# Patient Record
Sex: FEMALE | Race: WHITE | Employment: OTHER | URBAN - METROPOLITAN AREA
[De-identification: names, ages, dates, MRNs, and addresses within clinical notes are randomized per-mention and may not be internally consistent; named-entity substitution may affect disease eponyms.]

---

## 2017-01-03 ENCOUNTER — OFFICE VISIT (OUTPATIENT)
Dept: INTERNAL MEDICINE CLINIC | Age: 72
End: 2017-01-03

## 2017-01-03 VITALS
WEIGHT: 165 LBS | BODY MASS INDEX: 31.15 KG/M2 | HEART RATE: 60 BPM | SYSTOLIC BLOOD PRESSURE: 143 MMHG | RESPIRATION RATE: 16 BRPM | OXYGEN SATURATION: 98 % | DIASTOLIC BLOOD PRESSURE: 81 MMHG | HEIGHT: 61 IN | TEMPERATURE: 98.1 F

## 2017-01-03 DIAGNOSIS — F41.9 ANXIETY AND DEPRESSION: ICD-10-CM

## 2017-01-03 DIAGNOSIS — E78.00 PURE HYPERCHOLESTEROLEMIA: Chronic | ICD-10-CM

## 2017-01-03 DIAGNOSIS — F32.A ANXIETY AND DEPRESSION: ICD-10-CM

## 2017-01-03 DIAGNOSIS — R73.03 PREDIABETES: ICD-10-CM

## 2017-01-03 DIAGNOSIS — I25.10 ATHEROSCLEROSIS OF NATIVE CORONARY ARTERY OF NATIVE HEART WITHOUT ANGINA PECTORIS: Chronic | ICD-10-CM

## 2017-01-03 DIAGNOSIS — I10 ESSENTIAL HYPERTENSION: Primary | Chronic | ICD-10-CM

## 2017-01-03 DIAGNOSIS — M16.12 PRIMARY OSTEOARTHRITIS OF LEFT HIP: ICD-10-CM

## 2017-01-03 NOTE — PROGRESS NOTES
Reviewed record in preparation for visit and have obtained necessary documentation. Identified pt with two pt identifiers(name and ). Chief Complaint   Patient presents with    Pre-op Exam     left hip on 17       Health Maintenance Due   Topic Date Due    ZOSTER VACCINE AGE 60>  2004    OSTEOPOROSIS SCREENING (DEXA)  2009    GLAUCOMA SCREENING Q2Y  2016    MEDICARE YEARLY EXAM  2016       Ms. Elliott Sicard has a reminder for a \"due or due soon\" health maintenance. I have asked that she discuss health maintenance topic(s) due with Her  primary care provider. Coordination of Care Questionnaire:  :     1) Have you been to an emergency room, urgent care clinic since your last visit? no   Hospitalized since your last visit? no             2) Have you seen or consulted any other health care providers outside of [address] [place] [address] since your last visit? no  (Include any pap smears or colon screenings in this section.)      Patient is accompanied by self I have received verbal consent from Briseida Goldberg to discuss any/all medical information while they are present in the room. \

## 2017-01-03 NOTE — PROGRESS NOTES
Sumi Buck is a 66 y.o. female who presents for evaluation of preop eval for upcoming left hip replacement with dr Lenin murphy. Has already seen dr Aidan Meade and had normal ecg and tte. Would like to get off of lexapro. Was started few years ago after her brother  and while her mom was really sick. ROS:  Constitutional: negative for fevers, chills, anorexia and weight loss  Eyes:   negative for visual disturbance and irritation  ENT:   negative for tinnitus,sore throat,nasal congestion,ear pain,hoarseness  Respiratory:  negative for cough, hemoptysis, dyspnea,wheezing  CV:   negative for chest pain, palpitations, lower extremity edema  GI:   negative for nausea, vomiting, diarrhea, abdominal pain,melena  Genitourinary: negative for frequency, dysuria and hematuria  Musculoskel: negative for myalgias, arthralgias, back pain, muscle weakness. ++left hip joint pain  Neurological:  negative for headaches, dizziness, focal weakness, numbness  Psychiatric:     Negative for depression or anxiety      Past Medical History   Diagnosis Date    Asthma      mild, uses inhaler prn only. No problems x > 1 year    Broken wrist 2013     right wrist    CAD (coronary artery disease) 2361,0136     stents. x2 Currently asymptomatic & exercises 5x/ week    Cancer ([facility] [address]      skin, mole removed    DDD (degenerative disc disease)     DJD (degenerative joint disease)     Environmental allergies     Hypercholesterolemia     Hypertension     Osteopenia     Recurrent UTI        Past Surgical History   Procedure Laterality Date    Endoscopy, colon, diagnostic  7/15//2005     neg; 10 year repeat; Dr. Salome Mueller Hx heart catheterization       1 stent :  R coronary    Hx heart catheterization       LAD stendted 2012    Hx tonsillectomy      Hx tympanostomy  2013     in Right ear    Hx appendectomy      Hx cholecystectomy      Hx jimi and bso      Hx orthopaedic  2011     right RCR  Hx wrist fracture tx  5/2013     ORIF    Hx orthopaedic  1/14/14     L4-5 DECOMPRESSION AND FUSION    Hx orthopaedic       r toe, bone removal    Hx orthopaedic Right 2/2015     THR    Colonoscopy,diagnostic  11/20/2015      tics; no polyps; Dr. Oswaldo Gold; no follow up for screening       Family History   Problem Relation Age of Onset    Elevated Lipids Mother     Hypertension Father     Heart Disease Father     Stroke Father     Other Brother      AAA    Cancer Brother      brain       Social History     Social History    Marital status:      Spouse name: N/A    Number of children: N/A    Years of education: N/A     Occupational History    Not on file. Social History Main Topics    Smoking status: Never Smoker    Smokeless tobacco: Never Used    Alcohol use 0.0 oz/week      Comment: Social      Drug use: No    Sexual activity: Yes     Partners: Male     Birth control/ protection: None     Other Topics Concern    Not on file     Social History Narrative            Visit Vitals    /81 (BP 1 Location: Left arm, BP Patient Position: Sitting)    Pulse 60    Temp 98.1 °F (36.7 °C)    Resp 16    Ht 5' 1\" (1.549 m)    Wt 165 lb (74.8 kg)    LMP  (LMP Unknown)    SpO2 98%    BMI 31.18 kg/m2       Physical Examination:   General - Well appearing female  HEENT - PERRL, TM no erythema/opacification, normal nasal turbinates, no oropharyngeal erythema or exudate, MMM  Neck - supple, no bruits, no thyroidomegaly, no lymphadenopathy  Pulm - clear to auscultation bilaterally  Cardio - RRR, normal S1 S2, no murmur  Abd - soft, nontender, no masses, no HSM  Extrem - no edema, +2 distal pulses  Neuro-  No focal deficits, CN intact     Assessment/Plan:    1. preop eval--no further workup needed, ok to proceed with sx as planned. 2.  htn--continue with cartia and hyzaar  3. Depression--was reactive, would like to stop lexapro. Taper written out for her.   4.  Cad--on asa, follows with dr Inocencio Jung  5. Prediabetes--a1c 6.0.  6. Left hip oa--for hip replacement on jan 17.    rtc 4 months.   Needs dexa bone scan done        Dread Jung III, DO

## 2017-01-03 NOTE — PATIENT INSTRUCTIONS
Hip Arthritis: Care Instructions  Your Care Instructions    Arthritis, also called osteoarthritis, is a breakdown of the tissue (cartilage) that cushions your joints. Many people have some arthritis as they age. When the cartilage in your hip joints wears down, your hip bone rubs against the hip socket. This causes pain and stiffness. Work with your doctor to find the right mix of treatments for your arthritis. There are things you can do at home to protect your hip joints, ease your pain, and help you stay active. But if your arthritis becomes so bad that you cannot walk, you may need surgery to replace the hip joint. Follow-up care is a key part of your treatment and safety. Be sure to make and go to all appointments, and call your doctor if you are having problems. Its also a good idea to know your test results and keep a list of the medicines you take. How can you care for yourself at home? · Stay at a healthy weight. Being overweight puts extra strain on your hip joints. · Talk to your doctor or physical therapist about exercises that will help ease hip pain. These tips may help. ¨ Stretch to help prevent stiffness and to prevent injury before you exercise. You may enjoy gentle forms of yoga to help keep your joints and muscles flexible. ¨ Walk instead of jog. Other types of exercise that are less stressful on the joints include riding a bike, swimming, and doing water exercise. ¨ Lift weights. Strong muscles help reduce stress on your joints. Stronger thigh muscles, for example, take some of the stress off of the knees and hips. Learn the right way to lift weights so you do not make joint pain worse. · Take pain medicines exactly as directed. ¨ If the doctor gave you a prescription medicine for pain, take it as prescribed. ¨ If you are not taking a prescription pain medicine, ask your doctor if you can take an over-the-counter medicine.   · Use a cane, crutch, walker, or another device if you need help to get around. These can help rest your hips. You also can use other things to make life easier, such as a higher toilet seat. · Do not sit in low chairs, which can make it painful to get up. · Put heat or cold on your sore hips as needed. Use whichever helps you most. You also can go back and forth between hot and cold packs. ¨ Apply heat 2 or 3 times a day for 20 to 30 minutesusing a heating pad, hot shower, or hot packto relieve pain and stiffness. ¨ Put ice or a cold pack on your sore hips for 10 to 20 minutes at a time to numb the area. Put a thin cloth between the ice and your skin. · Think about talking to your doctor about using capsaicin, a cream you apply to the skin for pain relief. When should you call for help? Call your doctor now or seek immediate medical care if:  · You have sudden swelling, warmth, or pain in any joint. · You have joint pain and a fever or rash. · You have such bad pain that you cannot use the joint. Watch closely for changes in your health, and be sure to contact your doctor if:  · You have mild joint symptoms that continue even with more than 6 weeks of care at home. · You do not get better as expected. · You have stomach pain or other problems with your medicine. Where can you learn more? Go to http://santo-juani.info/. Enter C666 in the search box to learn more about \"Hip Arthritis: Care Instructions. \"  Current as of: February 24, 2016  Content Version: 11.1  © [id number] [employer]. Care instructions adapted under license by "[employer] (which disclaims liability or warranty for this information). If you have questions about a medical condition or this instruction, always ask your healthcare professional. [place] [address] any warranty or liability for your use of this information. Wean lexapro.   Start 10 mg daily for next 7 days, then take every other day for next week, then every 3rd day for next week, then stop.

## 2017-01-03 NOTE — MR AVS SNAPSHOT
Visit Information Date & Time Provider Department Dept. Phone Encounter #  
 1/3/2017  3:00 PM Lyric Redmond, [address] [id number] Follow-up Instructions Return in about 4 months (around 5/3/2017). Upcoming Health Maintenance Date Due OSTEOPOROSIS SCREENING (DEXA) 3/1/2009 GLAUCOMA SCREENING Q2Y 11/6/2016 MEDICARE YEARLY EXAM 11/9/2016 BREAST CANCER SCRN MAMMOGRAM 11/5/2017 DTaP/Tdap/Td series (2 - Td) 8/4/2026 Allergies as of 1/3/2017  Review Complete On: 1/3/2017 By: Nitza Richard III, DO Severity Noted Reaction Type Reactions Tape [Adhesive] Medium 04/30/2013   Topical Rash Bandaids: redness Ciprofloxacin  11/09/2015   Not Verified Diarrhea And dizziness Pcn [Penicillins]  11/06/2009    Other (comments) \"whelps\" Prednisone  11/21/2012    Other (comments) Terrible downer Current Immunizations  Reviewed on 8/31/2015 Name Date Hepatitis A Vaccine 6/1/2009 Hepatitis B Vaccine 5/29/2009 Influenza Vaccine 10/6/2014, 9/18/2013 Influenza Vaccine (Quad) PF 8/31/2015 Influenza Vaccine Split 10/1/2011 Pneumococcal Vaccine (Unspecified Type) 9/7/2012 TD Vaccine 6/1/2009 Tdap 8/4/2016  5:21 PM  
  
 Not reviewed this visit You Were Diagnosed With   
  
 Codes Comments Essential hypertension    -  Primary ICD-10-CM: I10 
ICD-9-CM: 401.9 Atherosclerosis of native coronary artery of native heart without angina pectoris     ICD-10-CM: I25.10 ICD-9-CM: 414.01 Prediabetes     ICD-10-CM: R73.03 
ICD-9-CM: 790.29 Primary osteoarthritis of left hip     ICD-10-CM: M16.12 
ICD-9-CM: 715.15 Pure hypercholesterolemia     ICD-10-CM: E78.00 ICD-9-CM: 272.0 Anxiety and depression     ICD-10-CM: F41.9, F32.9 ICD-9-CM: 300.00, 311 Vitals BP Pulse Temp Resp Height(growth percentile) Weight(growth percentile) 143/81 (BP 1 Location: Left arm, BP Patient Position: Sitting) 60 98.1 °F (36.7 °C) 16 5' 1\" (1.549 m) 165 lb (74.8 kg) LMP SpO2 BMI OB Status Smoking Status (LMP Unknown) 98% 31.18 kg/m2 Hysterectomy Never Smoker Vitals History BMI and BSA Data Body Mass Index Body Surface Area  
 31.18 kg/m 2 1.79 m 2 Preferred Pharmacy Pharmacy Name Phone [facility]oc[facility] Cort[facility] [address] [place] [address] [address] [phone] Your Updated Medication List  
  
   
This list is accurate as of: 1/3/17  3:49 PM.  Always use your most recent med list.  
  
  
  
  
 acetaminophen 325 mg tablet Commonly known as:  TYLENOL Take 2 Tabs by mouth every six (6) hours. aspirin delayed-release 81 mg tablet Take 81 mg by mouth daily. atorvastatin 80 mg tablet Commonly known as:  LIPITOR  
TAKE 1 TABLET EVERY DAY  
  
 BENEFIBER (GUAR GUM) PO Take  by mouth. CALCIUM 600 + D PO Take 1 Tab by mouth daily. CARTIA  mg ER capsule Generic drug:  dilTIAZem CD  
TAKE 1 CAPSULE EVERY DAY  
  
 escitalopram oxalate 20 mg tablet Commonly known as:  Colan Big TAKE 1 TABLET EVERY DAY  
  
 gabapentin 300 mg capsule Commonly known as:  NEURONTIN Take 300 mg by mouth nightly. Indications: NEUROPATHIC PAIN  
  
 losartan-hydroCHLOROthiazide 100-25 mg per tablet Commonly known as:  HYZAAR Take 1 Tab by mouth daily. meloxicam 15 mg tablet Commonly known as:  MOBIC  
TAKE 1 TABLET EVERY DAY  
  
 MULTIVITAMIN PO Take 1 Tab by mouth daily. NASACORT AQ 55 mcg nasal inhaler Generic drug:  triamcinolone 1 [place] by Both Nostrils route daily. pneumococcal 13 luis armando conj dip 0.5 mL Syrg injection Commonly known as:  PREVNAR 13 (PF)  
0.5 mL by IntraMUSCular route PRIOR TO DISCHARGE for 1 dose. PREMARIN 0.625 mg/gram vaginal cream  
Generic drug:  conjugated estrogens ZyrTEC 10 mg Cap Generic drug:  Cetirizine Take 10 mg by mouth daily. Indications: ALLERGIC RHINITIS Follow-up Instructions Return in about 4 months (around 5/3/2017). To-Do List   
 01/09/2017 9:00 AM  
  Appointment with [facility] PAT ROOM P2 at [address] [place] [address] ([phone]) 01/09/2017 11:15 AM  
  Appointment with [facility] at [facility] PHYSICAL THERAPY ([phone]) Patient Instructions Hip Arthritis: Care Instructions Your Care Instructions Arthritis, also called osteoarthritis, is a breakdown of the tissue (cartilage) that cushions your joints. Many people have some arthritis as they age. When the cartilage in your hip joints wears down, your hip bone rubs against the hip socket. This causes pain and stiffness. Work with your doctor to find the right mix of treatments for your arthritis. There are things you can do at home to protect your hip joints, ease your pain, and help you stay active. But if your arthritis becomes so bad that you cannot walk, you may need surgery to replace the hip joint. Follow-up care is a key part of your treatment and safety. Be sure to make and go to all appointments, and call your doctor if you are having problems. Its also a good idea to know your test results and keep a list of the medicines you take. How can you care for yourself at home? · Stay at a healthy weight. Being overweight puts extra strain on your hip joints. · Talk to your doctor or physical therapist about exercises that will help ease hip pain. These tips may help. ¨ Stretch to help prevent stiffness and to prevent injury before you exercise. You may enjoy gentle forms of yoga to help keep your joints and muscles flexible. ¨ Walk instead of jog. Other types of exercise that are less stressful on the joints include riding a bike, swimming, and doing water exercise. ¨ Lift weights. Strong muscles help reduce stress on your joints.  Stronger thigh muscles, for example, take some of the stress off of the knees and hips. Learn the right way to lift weights so you do not make joint pain worse. · Take pain medicines exactly as directed. ¨ If the doctor gave you a prescription medicine for pain, take it as prescribed. ¨ If you are not taking a prescription pain medicine, ask your doctor if you can take an over-the-counter medicine. · Use a cane, crutch, walker, or another device if you need help to get around. These can help rest your hips. You also can use other things to make life easier, such as a higher toilet seat. · Do not sit in low chairs, which can make it painful to get up. · Put heat or cold on your sore hips as needed. Use whichever helps you most. You also can go back and forth between hot and cold packs. ¨ Apply heat 2 or 3 times a day for 20 to 30 minutesusing a heating pad, hot shower, or hot packto relieve pain and stiffness. ¨ Put ice or a cold pack on your sore hips for 10 to 20 minutes at a time to numb the area. Put a thin cloth between the ice and your skin. · Think about talking to your doctor about using capsaicin, a cream you apply to the skin for pain relief. When should you call for help? Call your doctor now or seek immediate medical care if: 
· You have sudden swelling, warmth, or pain in any joint. · You have joint pain and a fever or rash. · You have such bad pain that you cannot use the joint. Watch closely for changes in your health, and be sure to contact your doctor if: 
· You have mild joint symptoms that continue even with more than 6 weeks of care at home. · You do not get better as expected. · You have stomach pain or other problems with your medicine. Where can you learn more? Go to http://santo-juani.info/. Enter K602 in the search box to learn more about \"Hip Arthritis: Care Instructions. \" Current as of: February 24, 2016 Content Version: 11.1 © 2959-3462 [employer]. Care instructions adapted under license by [employer] (which disclaims liability or warranty for this information). If you have questions about a medical condition or this instruction, always ask your healthcare professional. [address] any warranty or liability for your use of this information. Wean lexapro. Start 10 mg daily for next 7 days, then take every other day for next week, then every 3rd day for next week, then stop. Introducing [facility]! Eloise Howell introduces Amitree patient portal. Now you can access parts of your medical record, email your doctor's office, and request medication refills online. 1. In your internet browser, go to [email or web address] 2. Click on the First Time User? Click Here link in the Sign In box. You will see the New Member Sign Up page. 3. Enter your Amitree Access Code exactly as it appears below. You will not need to use this code after youve completed the sign-up process. If you do not sign up before the expiration date, you must request a new code. · Amitree Access Code: [email or web address] Expires: 4/3/2017 10:15 AM 
 
4. Enter the last four digits of your Social Security Number (xxxx) and Date of Birth (mm/dd/yyyy) as indicated and click Submit. You will be taken to the next sign-up page. 5. Create a Amitree ID. This will be your Amitree login ID and cannot be changed, so think of one that is secure and easy to remember. 6. Create a Amitree password. You can change your password at any time. 7. Enter your Password Reset Question and Answer. This can be used at a later time if you forget your password. 8. Enter your e-mail address. You will receive e-mail notification when new information is available in [address] 9. Click Sign Up. You can now view and download portions of your medical record. 10. Click the Download Summary menu link to download a portable copy of your medical information. If you have questions, please visit the Frequently Asked Questions section of the Xumii website. Remember, Xumii is NOT to be used for urgent needs. For medical emergencies, dial 911. Now available from your iPhone and Android! Please provide this summary of care documentation to your next provider. Your primary care clinician is listed as [facility] If you have any questions after today's visit, please call [phone].

## 2017-01-09 ENCOUNTER — HOSPITAL ENCOUNTER (OUTPATIENT)
Dept: PREADMISSION TESTING | Age: 72
Discharge: HOME | End: 2017-01-09
Payer: MEDICARE

## 2017-01-09 ENCOUNTER — HOSPITAL ENCOUNTER (OUTPATIENT)
Dept: PHYSICAL THERAPY | Age: 72
Discharge: HOME | End: 2017-01-09

## 2017-01-09 VITALS
WEIGHT: 166 LBS | HEIGHT: 61 IN | TEMPERATURE: 97.4 F | SYSTOLIC BLOOD PRESSURE: 137 MMHG | HEART RATE: 67 BPM | DIASTOLIC BLOOD PRESSURE: 68 MMHG | BODY MASS INDEX: 31.34 KG/M2 | OXYGEN SATURATION: 98 % | RESPIRATION RATE: 18 BRPM

## 2017-01-09 LAB
ABO + RH BLD: NORMAL
ALBUMIN SERPL BCP-MCNC: 4.1 G/DL (ref 3.5–5)
ALBUMIN/GLOB SERPL: 1.4 {RATIO} (ref 1.1–2.2)
ALP SERPL-CCNC: 140 U/L (ref 45–117)
ALT SERPL-CCNC: 38 U/L (ref 12–78)
ANION GAP BLD CALC-SCNC: 8 MMOL/L (ref 5–15)
APPEARANCE UR: CLEAR
AST SERPL W P-5'-P-CCNC: 24 U/L (ref 15–37)
BACTERIA URNS QL MICRO: NEGATIVE /HPF
BILIRUB SERPL-MCNC: 0.5 MG/DL (ref 0.2–1)
BILIRUB UR QL: NEGATIVE
BLOOD GROUP ANTIBODIES SERPL: NORMAL
BUN SERPL-MCNC: 19 MG/DL (ref 6–20)
BUN/CREAT SERPL: 20 (ref 12–20)
CALCIUM SERPL-MCNC: 8.8 MG/DL (ref 8.5–10.1)
CHLORIDE SERPL-SCNC: 104 MMOL/L (ref 97–108)
CO2 SERPL-SCNC: 29 MMOL/L (ref 21–32)
COLOR UR: ABNORMAL
CREAT SERPL-MCNC: 0.95 MG/DL (ref 0.55–1.02)
EPITH CASTS URNS QL MICRO: ABNORMAL /LPF
ERYTHROCYTE [DISTWIDTH] IN BLOOD BY AUTOMATED COUNT: 13 % (ref 11.5–14.5)
EST. AVERAGE GLUCOSE BLD GHB EST-MCNC: 117 MG/DL
GLOBULIN SER CALC-MCNC: 3 G/DL (ref 2–4)
GLUCOSE SERPL-MCNC: 82 MG/DL (ref 65–100)
GLUCOSE UR STRIP.AUTO-MCNC: NEGATIVE MG/DL
HBA1C MFR BLD: 5.7 % (ref 4.2–6.3)
HCT VFR BLD AUTO: 42.8 % (ref 35–47)
HGB BLD-MCNC: 14.3 G/DL (ref 11.5–16)
HGB UR QL STRIP: NEGATIVE
INR PPP: 1 (ref 0.9–1.1)
KETONES UR QL STRIP.AUTO: NEGATIVE MG/DL
LEUKOCYTE ESTERASE UR QL STRIP.AUTO: ABNORMAL
MCH RBC QN AUTO: 31.6 PG (ref 26–34)
MCHC RBC AUTO-ENTMCNC: 33.4 G/DL (ref 30–36.5)
MCV RBC AUTO: 94.7 FL (ref 80–99)
NITRITE UR QL STRIP.AUTO: NEGATIVE
PH UR STRIP: 7 [PH] (ref 5–8)
PLATELET # BLD AUTO: 248 K/UL (ref 150–400)
POTASSIUM SERPL-SCNC: 3.5 MMOL/L (ref 3.5–5.1)
PROT SERPL-MCNC: 7.1 G/DL (ref 6.4–8.2)
PROT UR STRIP-MCNC: NEGATIVE MG/DL
PROTHROMBIN TIME: 10.2 SEC (ref 9–11.1)
RBC # BLD AUTO: 4.52 M/UL (ref 3.8–5.2)
RBC #/AREA URNS HPF: ABNORMAL /HPF (ref 0–5)
SODIUM SERPL-SCNC: 141 MMOL/L (ref 136–145)
SP GR UR REFRACTOMETRY: 1.01 (ref 1–1.03)
SPECIMEN EXP DATE BLD: NORMAL
UA: UC IF INDICATED,UAUC: ABNORMAL
UROBILINOGEN UR QL STRIP.AUTO: 0.2 EU/DL (ref 0.2–1)
WBC # BLD AUTO: 5.9 K/UL (ref 3.6–11)
WBC URNS QL MICRO: ABNORMAL /HPF (ref 0–4)

## 2017-01-09 PROCEDURE — 97161 PT EVAL LOW COMPLEX 20 MIN: CPT

## 2017-01-09 PROCEDURE — 36415 COLL VENOUS BLD VENIPUNCTURE: CPT | Performed by: ORTHOPAEDIC SURGERY

## 2017-01-09 PROCEDURE — 86900 BLOOD TYPING SEROLOGIC ABO: CPT | Performed by: ORTHOPAEDIC SURGERY

## 2017-01-09 PROCEDURE — G8979 MOBILITY GOAL STATUS: HCPCS

## 2017-01-09 PROCEDURE — G8980 MOBILITY D/C STATUS: HCPCS

## 2017-01-09 PROCEDURE — 85027 COMPLETE CBC AUTOMATED: CPT | Performed by: ORTHOPAEDIC SURGERY

## 2017-01-09 PROCEDURE — 81001 URINALYSIS AUTO W/SCOPE: CPT | Performed by: ORTHOPAEDIC SURGERY

## 2017-01-09 PROCEDURE — 83036 HEMOGLOBIN GLYCOSYLATED A1C: CPT | Performed by: ORTHOPAEDIC SURGERY

## 2017-01-09 PROCEDURE — G8978 MOBILITY CURRENT STATUS: HCPCS

## 2017-01-09 PROCEDURE — 85610 PROTHROMBIN TIME: CPT | Performed by: ORTHOPAEDIC SURGERY

## 2017-01-09 PROCEDURE — 80053 COMPREHEN METABOLIC PANEL: CPT | Performed by: ORTHOPAEDIC SURGERY

## 2017-01-09 RX ORDER — CELECOXIB 200 MG/1
200 CAPSULE ORAL ONCE
Status: CANCELLED | OUTPATIENT
Start: 2017-01-17 | End: 2017-01-17

## 2017-01-09 RX ORDER — DOXAZOSIN 2 MG/1
2 TABLET ORAL
COMMUNITY

## 2017-01-09 RX ORDER — ACETAMINOPHEN 500 MG
500 TABLET ORAL
COMMUNITY
End: 2017-01-19

## 2017-01-09 RX ORDER — LEVOFLOXACIN 5 MG/ML
500 INJECTION, SOLUTION INTRAVENOUS ONCE
Status: CANCELLED | OUTPATIENT
Start: 2017-01-17 | End: 2017-01-17

## 2017-01-09 RX ORDER — PREGABALIN 75 MG/1
75 CAPSULE ORAL ONCE
Status: CANCELLED | OUTPATIENT
Start: 2017-01-17 | End: 2017-01-17

## 2017-01-09 RX ORDER — SODIUM CHLORIDE, SODIUM LACTATE, POTASSIUM CHLORIDE, CALCIUM CHLORIDE 600; 310; 30; 20 MG/100ML; MG/100ML; MG/100ML; MG/100ML
25 INJECTION, SOLUTION INTRAVENOUS CONTINUOUS
Status: CANCELLED | OUTPATIENT
Start: 2017-01-17

## 2017-01-09 RX ORDER — ACETAMINOPHEN 325 MG/1
650 TABLET ORAL ONCE
Status: CANCELLED | OUTPATIENT
Start: 2017-01-17 | End: 2017-01-17

## 2017-01-09 NOTE — PROGRESS NOTES
[facility]  Physical Therapy Pre-surgery evaluation  [address] [facility], [address] [facility]    physical Therapy pre THR surgery EVALUATION  Patient: Trisha Parekh (40 y.o. female)  Date: 1/9/2017  Primary Diagnosis: left hip DJD            ASSESSMENT :  Based on the objective data described below, the patient presents with with impaired gait, balance, pain, and overall high level functional mobility due to end stage degenerative joint disease in the L hip. She had her R hip replaced 2 years ago and then had back surgery 3 years ago. She has 2 herniated discs currently and has been going to preop PT. Her mother was present during session. Her mother had also had joint replacement. Discussed anticipated disposition to home with possible discharge within a 1 to 2 day time frame post-surgery. Patient and  in agreement. Lives with  and mother. She reports mother will be more of  than . Navid Brown GOALS: (Goals have been discussed and agreed upon with patient.)  DISCHARGE GOALS: Time Frame: 1 DAY  1. Patient will demonstrate increased strength, range of motion, and pain control via a home exercise program in order to minimize functional deficits in preparation for their upcoming surgery. This will be achieved by using education, demonstration and through the use of an informational handout including a home exercise program.  REHABILITATION POTENTIAL FOR STATED GOALS: Good     INTERVENTIONS PLANNED: (Benefits and precautions of physical therapy have been discussed with the patient.)  1. Home Exercise Program  TREATMENT PLAN EFFECTIVE DATES: 1/9/2017 TO 1/9/2017  FREQUENCY/DURATION: Patient to continue to perform home exercise program at least twice daily until her surgery. SUBJECTIVE:   Patient stated I have done it before, same ol same ol haha.     OBJECTIVE DATA SUMMARY:     Past Medical History   Diagnosis Date    Asthma      mild, uses inhaler prn only.  No problems x > 1 year    Broken wrist 11/20/2013     right wrist    CAD (coronary artery disease) 0062,4901     stents. x2 Currently asymptomatic & exercises 5x/ week    Cancer ([facility] [address]      skin, mole removed    DDD (degenerative disc disease)     DJD (degenerative joint disease)     Environmental allergies     Hypercholesterolemia     Hypertension     Osteopenia     Recurrent UTI      Past Surgical History   Procedure Laterality Date    Endoscopy, colon, diagnostic  7/15//2005     neg; 10 year repeat; Dr. Dinah Jiang Hx heart catheterization  2009     1 stent : 2009 R coronary    Hx heart catheterization       LAD stendted 5/2012    Hx tonsillectomy      Hx tympanostomy  02/2013     in Right ear    Hx appendectomy      Hx cholecystectomy      Hx jimi and bso      Hx orthopaedic  2/2011     right RCR     Hx wrist fracture tx  5/2013     ORIF    Hx orthopaedic  1/14/14     L4-5 DECOMPRESSION AND FUSION    Hx orthopaedic       r toe, bone removal    Hx orthopaedic Right 2/2015     THR    Colonoscopy,diagnostic  11/20/2015      tics; no polyps; Dr. Elisha Salgado; no follow up for screening     Prior Level of Function/Home Situation: pt lives with  and mother. She does not use AD but had all of it from previous surgeries. Independent with mobility and ADLs  Home Situation  Home Environment: Private residence  # Steps to Enter: 6  Rails to Enter: Yes  Hand Rails : Right  One/Two Story Residence: One story  Living Alone: Yes  Support Systems: Spouse/Significant Other/Partner, Family member(s)  Patient Expects to be Discharged to[de-identified] Private residence  Current DME Used/Available at Home: Alveta Gallery, rollator, Walker, rolling, Judene Desanctis, straight, Commode, bedside, Shower chair, Grab bars  Tub or Shower Type: Shower    ADLs (Current Functional Status):    Bathing/Showering:   [x] Independent  [] Requires Assistance from Someone  [] Sponge Bath Only   Ambulation:  [x] Independent  [] Walk Indoors Only  [] Walk Outdoors  [] Use Assistive Device  [] Use Wheelchair Only     Dressing:  [x] [address] from Someone for:  [] Sock/Shoes  [] Pants  [] Everything   Household Activities:  [] Routine house and yard work  [x] Light Housework Only  [] None       Critical Behavior:    alert and oriented x 4             Strength:    Strength: Within functional limits                    Tone & Sensation:   Tone: Normal              Sensation: Intact               Range Of Motion:  AROM: Within functional limits           PROM: Within functional limits           Coordination:  Coordination: Within functional limits    Functional Mobility:  Transfers:  Sit to Stand: Independent  Stand to Sit: Independent                       Balance:   Sitting: Intact  Standing: Intact  Ambulation/Gait Training:  Distance (ft): 30 Feet (ft)     Ambulation - Level of Assistance: Independent        Gait Abnormalities: Antalgic, Trunk sway increased             Therapeutic Exercises:   The patient was educated in, has demonstrated, and has received written instructions to complete for their home exercise program per total hip replacement protocol. Functional Measure:  Lower Extremity Functional Scale (LEFS):      Score 30/80     Percentage of impairment CH  0% CI  1-19% CJ  20-39% CK  40-59% CL  60-79% CM  80-99% CN  100%   LEFS score:  0-80 80 64-79 47-63 31-46 16-30 1-15 0     Cutt-offs: None established  TKA and MALIKA:   (Mikado et al, 2000)  MDC = 9 points   MCID = 9 points           G codes: In compliance with CMSs Claims Based Outcome Reporting, the following G-code set was chosen for this patient based on their primary functional limitation being treated: The outcome measure chosen to determine the severity of the functional limitation was the LEFS with a score of 30/80 which was correlated with the impairment scale.     ? Mobility - Walking and Moving Around:     - CURRENT STATUS: CL - 60%-79% impaired, limited or restricted    - GOAL STATUS: CL - 60%-79% impaired, limited or restricted    - D/C STATUS:  CL - 60%-79% impaired, limited or restricted     Pain:                      Activity Tolerance:   WFL      COMMUNICATION/EDUCATION:     The patient was educated on:  [x]         Early post operative mobility is imperative to achieve a patient's desired outcomes and to restore biological function. [x]         Post operative hip precautions may/may not be applicable. These precautions are based on the patient's physician and the procedure(s) performed. [x]         Anterior hip precautions including:  ·  No hip extension  ·       No external rotation  ·       No crossing of the legs/midline    []         Posterior hip precautions including:  ·  No hip flexion >90 degrees  ·       No internal rotation  ·       No crossing of the legs/midline    The patients plan of care was discussed as follows:   [x]         The patient verbalized understanding of her plan in preparation for their upcoming surgery  [x]         The patient's  was present for this session  []        The patient reports that he/she does not have a  identified at this time  [x]         The  verbalized understanding of the education regarding the patient's upcoming surgery  []         Patient/family agree to work toward stated goals and plan of care. []         Patient understands intent and goals of therapy, but is neutral about his/her participation. []         Patient is unable to participate in goal setting and plan of care.       Thank you for this referral.  Felicita Headley, PT, DPT   Time Calculation: 14 mins

## 2017-01-09 NOTE — PERIOP NOTES
[facility]  Preoperative Instructions        Surgery Date 01/17/17          Time of Arrival 0600 am Contact # [phone]    1. On the day of your surgery, please report to the Surgical Services Registration Desk and sign in at your designated time. The Surgery Center is located to the right of the Emergency Room. 2. You must have someone with you to drive you home. You should not drive a car for 24 hours following surgery. Please make arrangements for a friend or family member to stay with you for the first 24 hours after your surgery. 3. Do not have anything to eat or drink (including water, gum, mints, coffee, juice) after midnight 01/16/2017. This may not apply to medications prescribed by your physician. Please note special instructions, if applicable. If you are currently taking Plavix, Coumadin, or other blood-thinning agents, contact your surgeon for instructions. 4. We recommend you do not drink any alcoholic beverages for 24 hours before and after your surgery. 5. Have a list of all current medications, including vitamins, herbal supplements and any other over the counter medications. Stop all Aspirin and non-steroidal anti-inflammatory drugs (I.e. Advil, Aleve), as directed by your surgeon's office. Stop all vitamins and herbal supplements seven days prior to your surgery. 6. Wear comfortable clothes. Wear glasses instead of contacts. Do not bring any money or jewelry. Please bring picture ID, insurance card, and any prearranged co-payment or hospital payment. Do not wear make-up, particularly mascara the morning of your surgery. Do not wear nail polish, particularly if you are having foot /hand surgery. Wear your hair loose or down, no ponytails, buns, natalia pins or clips. All body piercings must be removed.   Please shower with antibacterial soap for three consecutive days before and on the morning of surgery, but do not apply any lotions, powders or deodorants after the shower on the day of surgery. Please use a fresh towels after each shower. Please sleep in clean clothes and change bed linens the night before surgery. Please do not shave for 48 hours prior to surgery. Shaving of the face is acceptable. 7. You should understand that if you do not follow these instructions your surgery may be cancelled. If your physical condition changes (I.e. fever, cold or flu) please contact your surgeon as soon as possible. 8. It is important that you be on time. If a situation occurs where you may be late, please call [phone] (OR Holding Area). 9. If you have any questions and or problems, please call [phone] (Pre-admission Testing). 10. Your surgery time may be subject to change. You will receive a phone call the evening prior if your time changes. 11.  If having outpatient surgery, you must have someone to drive you here, stay with you during the duration of your stay, and to drive you home at time of discharge. Special Instructions:    MEDICATIONS TO TAKE THE MORNING OF SURGERY WITH A SIP OF WATER:tylenol if needed, cardizem, zyrtec, escitalopram, gabapentin, nasocort inhaler      I understand a pre-operative phone call will be made to verify my surgery time. In the event that I am not available, I give permission for a message to be left on my answering service and/or with another person?   yes         ___________________      __________   _________    (Signature of Patient)             (Witness)                (Date and Time)

## 2017-01-09 NOTE — PERIOP NOTES
Preventing Infections Before  and After  Your Surgery  IMPORTANT INSTRUCTIONS    Please read and follow these instructions carefully. Every Night for Three (3) nights before your surgery:  1. Shower with an antibacterial soap, such as Dial, or the soap provided at your preassessment appointment. A shower is better than a bath for cleaning your skin. 2. If needed, ask someone to help you reach all areas of your body. Dont forget to clean your belly button with every shower. The night before your surgery:  1. On the night before your surgery, shower with an antibacterial soap, such as Dial, or the soap provided at your preassessment appointment. 2. With one packet of Hibiclens in hand, turn water off.  3. Apply Hibiclens antiseptic skin cleanser with a clean, freshly washed washcloth. ? Gently apply to your body from chin to toes (except the genital area) and especially the area(s) where your incision(s) will be. ? Leave Hibiclens on your skin for at least 20 seconds. CAUTION: If needed, Hibiclens may be used to clean the folds of skin of the legs (such as in the area of the groin) and on your buttocks and hips. However, do not use Hibiclens above the neck or in the genital area (your bottom) or put inside any area of your body. 4. Turn the water back on and rinse. 5. Dry gently with a clean, freshly washed towel. 6. After your shower, do not use any powder, deodorant, perfumes or lotion. 7. Use clean, freshly washed towels and washcloths every time you shower. 8. Wear clean, freshly washed pajamas to bed the night before surgery. 9. Sleep on clean, freshly washed sheets. 10. Do not allow pets to sleep in your bed with you. The Morning of your surgery:  1. Shower again thoroughly with an antibacterial soap, such as Dial or the soap provided at your preassessment appointment. If needed, ask someone for help to reach all areas of your body. Dont forget to clean your belly button! Rinse.   2. Dry gently with a clean, freshly washed towel. 3. After your shower, do not use any powder, deodorant, perfumes or lotion prior to surgery. 4. Put on clean, freshly washed clothing. Tips to help prevent infections after your surgery:  1. Protect your surgical wound from germs:  ? Hand washing is the most important thing you and your caregivers can do to prevent infections. ? Keep your bandage clean and dry! ? Do not touch your surgical wound. 2. Use clean, freshly washed towels and washcloths every time you shower; do not share bath linens with others. 3. Until your surgical wound is healed, wear clothing and sleep on bed linens each day that are clean and freshly washed. 4. Do not allow pets to sleep in your bed with you or touch your surgical wound. 5. Do not smoke  smoking delays wound healing. This may be a good time to stop smoking. 6. If you have diabetes, it is important for you to manage your blood sugar levels properly before your surgery as well as after your surgery. Poorly managed blood sugar levels slow down wound healing and prevent you from healing completely.

## 2017-01-10 LAB
BACTERIA SPEC CULT: NORMAL
BACTERIA SPEC CULT: NORMAL
SERVICE CMNT-IMP: NORMAL

## 2017-01-11 NOTE — PERIOP NOTES
Called Dr Kristian Corley office and spoke to [facility] to follow up about MRSA culture of Apparent Staph. She asked me to re-fax labs. Done per requests. Fax confirmed.

## 2017-01-13 NOTE — PERIOP NOTES
Spoke to [place] in Dr Vilchis and pt will be treated with bactroban ointment BID for 5 days for Apparent Staph. Script will be called into pt's pharmacy and pt will be notified. Added to prior to admission meds.

## 2017-01-17 ENCOUNTER — APPOINTMENT (OUTPATIENT)
Dept: GENERAL RADIOLOGY | Age: 72
DRG: 470 | End: 2017-01-17
Attending: ORTHOPAEDIC SURGERY
Payer: MEDICARE

## 2017-01-17 ENCOUNTER — ANESTHESIA EVENT (OUTPATIENT)
Dept: SURGERY | Age: 72
DRG: 470 | End: 2017-01-17
Payer: MEDICARE

## 2017-01-17 ENCOUNTER — ANESTHESIA (OUTPATIENT)
Dept: SURGERY | Age: 72
DRG: 470 | End: 2017-01-17
Payer: MEDICARE

## 2017-01-17 PROCEDURE — 73501 X-RAY EXAM HIP UNI 1 VIEW: CPT

## 2017-01-17 PROCEDURE — 77030008684 HC TU ET CUF COVD -B: Performed by: ANESTHESIOLOGY

## 2017-01-17 PROCEDURE — 74011250636 HC RX REV CODE- 250/636: Performed by: ORTHOPAEDIC SURGERY

## 2017-01-17 PROCEDURE — 74011000258 HC RX REV CODE- 258

## 2017-01-17 PROCEDURE — 77030026438 HC STYL ET INTUB CARD -A: Performed by: ANESTHESIOLOGY

## 2017-01-17 PROCEDURE — 74011250636 HC RX REV CODE- 250/636

## 2017-01-17 PROCEDURE — 74011000250 HC RX REV CODE- 250

## 2017-01-17 PROCEDURE — 77030013079 HC BLNKT BAIR HGGR 3M -A: Performed by: ANESTHESIOLOGY

## 2017-01-17 PROCEDURE — 76001 XR FLUOROSCOPY OVER 60 MINUTES: CPT

## 2017-01-17 RX ORDER — PHENYLEPHRINE HCL IN 0.9% NACL 0.4MG/10ML
SYRINGE (ML) INTRAVENOUS AS NEEDED
Status: DISCONTINUED | OUTPATIENT
Start: 2017-01-17 | End: 2017-01-17 | Stop reason: HOSPADM

## 2017-01-17 RX ORDER — FENTANYL CITRATE 50 UG/ML
INJECTION, SOLUTION INTRAMUSCULAR; INTRAVENOUS AS NEEDED
Status: DISCONTINUED | OUTPATIENT
Start: 2017-01-17 | End: 2017-01-17 | Stop reason: HOSPADM

## 2017-01-17 RX ORDER — SUCCINYLCHOLINE CHLORIDE 20 MG/ML
INJECTION INTRAMUSCULAR; INTRAVENOUS AS NEEDED
Status: DISCONTINUED | OUTPATIENT
Start: 2017-01-17 | End: 2017-01-17 | Stop reason: HOSPADM

## 2017-01-17 RX ORDER — ROCURONIUM BROMIDE 10 MG/ML
INJECTION, SOLUTION INTRAVENOUS AS NEEDED
Status: DISCONTINUED | OUTPATIENT
Start: 2017-01-17 | End: 2017-01-17 | Stop reason: HOSPADM

## 2017-01-17 RX ORDER — HYDROMORPHONE HYDROCHLORIDE 2 MG/ML
INJECTION, SOLUTION INTRAMUSCULAR; INTRAVENOUS; SUBCUTANEOUS AS NEEDED
Status: DISCONTINUED | OUTPATIENT
Start: 2017-01-17 | End: 2017-01-17 | Stop reason: HOSPADM

## 2017-01-17 RX ORDER — ONDANSETRON 2 MG/ML
INJECTION INTRAMUSCULAR; INTRAVENOUS AS NEEDED
Status: DISCONTINUED | OUTPATIENT
Start: 2017-01-17 | End: 2017-01-17 | Stop reason: HOSPADM

## 2017-01-17 RX ORDER — LIDOCAINE HYDROCHLORIDE 20 MG/ML
INJECTION, SOLUTION EPIDURAL; INFILTRATION; INTRACAUDAL; PERINEURAL AS NEEDED
Status: DISCONTINUED | OUTPATIENT
Start: 2017-01-17 | End: 2017-01-17 | Stop reason: HOSPADM

## 2017-01-17 RX ORDER — PROPOFOL 10 MG/ML
INJECTION, EMULSION INTRAVENOUS AS NEEDED
Status: DISCONTINUED | OUTPATIENT
Start: 2017-01-17 | End: 2017-01-17 | Stop reason: HOSPADM

## 2017-01-17 RX ORDER — DEXAMETHASONE SODIUM PHOSPHATE 4 MG/ML
INJECTION, SOLUTION INTRA-ARTICULAR; INTRALESIONAL; INTRAMUSCULAR; INTRAVENOUS; SOFT TISSUE AS NEEDED
Status: DISCONTINUED | OUTPATIENT
Start: 2017-01-17 | End: 2017-01-17 | Stop reason: HOSPADM

## 2017-01-17 RX ADMIN — PROPOFOL 20 MG: 10 INJECTION, EMULSION INTRAVENOUS at 10:48

## 2017-01-17 RX ADMIN — LEVOFLOXACIN 500 MG: 5 INJECTION, SOLUTION INTRAVENOUS at 08:29

## 2017-01-17 RX ADMIN — ONDANSETRON 4 MG: 2 INJECTION INTRAMUSCULAR; INTRAVENOUS at 10:09

## 2017-01-17 RX ADMIN — FENTANYL CITRATE 100 MCG: 50 INJECTION, SOLUTION INTRAMUSCULAR; INTRAVENOUS at 08:27

## 2017-01-17 RX ADMIN — Medication 120 MCG: at 10:07

## 2017-01-17 RX ADMIN — Medication 80 MCG: at 08:47

## 2017-01-17 RX ADMIN — Medication 80 MCG: at 09:51

## 2017-01-17 RX ADMIN — Medication 80 MCG: at 08:56

## 2017-01-17 RX ADMIN — DEXAMETHASONE SODIUM PHOSPHATE 4 MG: 4 INJECTION, SOLUTION INTRA-ARTICULAR; INTRALESIONAL; INTRAMUSCULAR; INTRAVENOUS; SOFT TISSUE at 08:40

## 2017-01-17 RX ADMIN — SUCCINYLCHOLINE CHLORIDE 140 MG: 20 INJECTION INTRAMUSCULAR; INTRAVENOUS at 08:27

## 2017-01-17 RX ADMIN — Medication 80 MCG: at 09:31

## 2017-01-17 RX ADMIN — LIDOCAINE HYDROCHLORIDE 80 MG: 20 INJECTION, SOLUTION EPIDURAL; INFILTRATION; INTRACAUDAL; PERINEURAL at 08:27

## 2017-01-17 RX ADMIN — PROPOFOL 20 MG: 10 INJECTION, EMULSION INTRAVENOUS at 10:52

## 2017-01-17 RX ADMIN — ROCURONIUM BROMIDE 5 MG: 10 INJECTION, SOLUTION INTRAVENOUS at 08:27

## 2017-01-17 RX ADMIN — Medication 80 MCG: at 08:52

## 2017-01-17 RX ADMIN — PROPOFOL 10 MG: 10 INJECTION, EMULSION INTRAVENOUS at 10:56

## 2017-01-17 RX ADMIN — Medication 120 MCG: at 10:11

## 2017-01-17 RX ADMIN — PROPOFOL 150 MG: 10 INJECTION, EMULSION INTRAVENOUS at 08:27

## 2017-01-17 RX ADMIN — Medication 80 MCG: at 09:36

## 2017-01-17 RX ADMIN — Medication 120 MCG: at 10:09

## 2017-01-17 RX ADMIN — Medication 80 MCG: at 09:41

## 2017-01-17 RX ADMIN — HYDROMORPHONE HYDROCHLORIDE 0.5 MG: 2 INJECTION, SOLUTION INTRAMUSCULAR; INTRAVENOUS; SUBCUTANEOUS at 08:56

## 2017-01-17 RX ADMIN — Medication 160 MCG: at 10:15

## 2017-01-17 RX ADMIN — Medication 120 MCG: at 09:46

## 2017-01-17 RX ADMIN — Medication 80 MCG: at 09:57

## 2017-01-17 NOTE — ANESTHESIA POSTPROCEDURE EVALUATION
Post-Anesthesia Evaluation and Assessment    Patient: Terry Lyon MRN: [id number]  SSN: [id number]    YOB: 1945  Age: 66 y.o. Sex: female       Cardiovascular Function/Vital Signs  Visit Vitals    /50    Pulse 60    Temp 36.4 °C (97.6 °F)    Resp 11    Ht 5' 1\" (1.549 m)    Wt 74.7 kg (164 lb 10.9 oz)    SpO2 97%    BMI 31.12 kg/m2       Patient is status post general anesthesia for Procedure(s):  LEFT TOTAL HIP ARTHROPLASTY ANTERIOR APPROACH WITH NAVIGATION. Nausea/Vomiting: None    Postoperative hydration reviewed and adequate. Pain:  Pain Scale 1: Numeric (0 - 10) (01/17/17 1200)  Pain Intensity 1: 1 (01/17/17 1200)   Managed    Neurological Status:   Neuro (WDL): Within Defined Limits (01/17/17 1200)  Neuro  Neurologic State: Drowsy; Eyes open to stimulus (01/17/17 1200)  Orientation Level: Oriented to person (01/17/17 1200)  Cognition: Appropriate for age attention/concentration; Follows commands (01/17/17 1200)  Speech: Clear (01/17/17 1200)  LUE Motor Response: Purposeful;Spontaneous  (01/17/17 1200)  LLE Motor Response: Purposeful;Spontaneous  (01/17/17 1200)  RUE Motor Response: Purposeful;Spontaneous  (01/17/17 1200)  RLE Motor Response: Purposeful;Spontaneous  (01/17/17 1200)   At baseline    Mental Status and Level of Consciousness: Arousable    Pulmonary Status:   O2 Device: Nasal cannula (01/17/17 1200)   Adequate oxygenation and airway patent    Complications related to anesthesia: None    Post-anesthesia assessment completed.  No concerns    Signed By: Evelyn Angel MD     January 17, 2017

## 2017-01-17 NOTE — ANESTHESIA PREPROCEDURE EVALUATION
Anesthetic History   No history of anesthetic complications            Review of Systems / Medical History  Patient summary reviewed, nursing notes reviewed and pertinent labs reviewed    Pulmonary            Asthma        Neuro/Psych             Comments: Tinnitus of right ear       Left lumbar radiculitis      Spinal stenosis      Hx of decompressive lumbar laminectomy      Hip osteoarthritis    Cardiovascular    Hypertension: well controlled          CAD and cardiac stents    Exercise tolerance: <4 METS     GI/Hepatic/Renal  Within defined limits              Endo/Other        Arthritis and cancer     Other Findings              Physical Exam    Airway  Mallampati: II  TM Distance: 4 - 6 cm  Neck ROM: normal range of motion   Mouth opening: Normal     Cardiovascular  Regular rate and rhythm,  S1 and S2 normal,  no murmur, click, rub, or gallop  Rhythm: regular  Rate: normal         Dental  No notable dental hx       Pulmonary  Breath sounds clear to auscultation               Abdominal  GI exam deferred       Other Findings            Anesthetic Plan    ASA: 3  Anesthesia type: general          Induction: Intravenous  Anesthetic plan and risks discussed with: Patient

## 2017-01-20 ENCOUNTER — PATIENT OUTREACH (OUTPATIENT)
Dept: INTERNAL MEDICINE CLINIC | Age: 72
End: 2017-01-20

## 2017-01-20 NOTE — PROGRESS NOTES
NNTOCIP Post Hospitalization       This note will not be viewable in [employer]. [facility]. [facility] to [facility] on 1/17/17 and discharged on 1/19/17 s/p Left Total Hip Arthroplasty (Dr. Vilchis). Significant Lab/Diagnostic Findings:  Lab Results   Component Value Date/Time    Sodium 142 01/18/2017 06:05 AM    Potassium 3.6 01/18/2017 06:05 AM    Chloride 107 01/18/2017 06:05 AM    CO2 23 01/18/2017 06:05 AM    Anion gap 12 01/18/2017 06:05 AM    Glucose 110 01/18/2017 06:05 AM    BUN 16 01/18/2017 06:05 AM    Creatinine 0.81 01/18/2017 06:05 AM    BUN/Creatinine ratio 20 01/18/2017 06:05 AM    GFR est AA >60 01/18/2017 06:05 AM    GFR est non-AA >60 01/18/2017 06:05 AM    Calcium 8.1 01/18/2017 06:05 AM      Component      Latest Ref Rng & Units 1/19/2017 1/18/2017           4:55 AM  6:05 AM   HGB      11.5 - 16.0 g/dL 10.7 (L) 11.4 (L)                   RRAT score:   Low Risk            11       Total Score        3 Relationship with PCP    4 More than 1 Admission in calendar year    4 Charlson Comorbidity Score        Criteria that do not apply:    Patient Living Status    Patient Length of Stay > 5    Patient Insurance is Medicare, Medicaid or Self Pay                  Advance Medical Directive on file in EMR? Yes; has an advanced directive - a copy has been provided. Patient was evaluated by care management during hospitalization. Per notes, Discharge Disposition from ED [facility]: Home with [address] through [facility]. Recommended Post-Hospital Discharge follow-up (see below as listed on Hospital Discharge AVS/Instructions). Follow-up Information     Follow up With Details Comments Contact Info      [address]   This is your provider for your home health. If you do not hear from them within 24-48 hours, please contact them. [address] [facility]  [phone]     [facility]   This your provider for DME (rolling walker).   [address] [place] [address] [place] III, DO     [facility]  [address] [facility]  [address] [address]  [phone]     Duyen Pisano MD In 2 weeks   [facility]  [address] [facility] [address]  [id number] [phone]           Discharge instructions:  - Anticoagulate with ASA  - Take pain medications as prescribed  - Resume pre hospital diet   - Discharge activity: activity as tolerated  - Ambulate with Walker;   - Weight bearing status WBAT  - Wound Care Keep wound clean and dry. See discharge instruction sheet.  - Staples to be removed 10 days after surgery         Most recent HIPAA form in the patient's chart (dated 2/18/15) was reviewed; authorized individual(s) listed on HIPAA form include Rose Walters. NN follow-up phone call  NN contacted the patient today to complete NN post-hospital discharge assessment. Two patient identifiers verified. NN introduced self to the patient, including NN role and purpose of NN follow-up phone call; patient verbalizes understanding. NN inquired about the patient's current condition and how the patient is feeling; patient states, \"pretty decent. \"  Patient states, \"I've had a little nausea. \" Patient denies vomiting. Encouraged patient to take Oxycodone with food; patient verbalizes understanding. Patient denies fever, chills, dizziness, chest pain. Patient denies increased/unrelieved pain since hospital discharge. See details of Functional Assessment below as reported by the Patient. Living Situation/Support System: Lives with her  and her Mother; reports a good support system. ADLs: Independent with ADLs. Mobility: Georgiana Cui; reports delivered to residence today by [employer]. Transportation: Patient's Mother will be providing her with transportation until she receives MD clearance to resume driving; reports able to drive prior to surgery.  Patient denies any concerns regarding transportation. Medication Management: Independently manages her medications. Financial Status: Denies any financial concerns regarding her mediations. Barriers to care? no       Allergies Reviewed with patient and Medication Reconciliation completed and updated. Patient verbalizes understanding of self management of medications and reports compliance with prescribed medication regimen. Med Rec during this call  Current Outpatient Prescriptions   Medication Sig    acetaminophen (TYLENOL) 325 mg tablet Take 2 Tabs by mouth every six (6) hours for 14 days.  famotidine (PEPCID) 20 mg tablet Take 1 Tab by mouth two (2) times a day for 30 days.  oxyCODONE IR (ROXICODONE) 5 mg immediate release tablet Take 1-2 Tabs by mouth every three (3) hours as needed. Max Daily Amount: 80 mg.  polyethylene glycol (MIRALAX) 17 gram packet Take 1 Packet by mouth daily as needed (constipation) for up to 15 days.  senna-docusate (PERICOLACE) 8.6-50 mg per tablet Take 1 Tab by mouth daily.  [START ON 2/2/2017] aspirin (ASPIRIN) 325 mg tablet Take 1 Tab by mouth two (2) times a day for 30 days. To begin after you have completing 2 weeks of Coumadin    doxazosin (CARDURA) 2 mg tablet Take 2 mg by mouth nightly.  CARTIA  mg ER capsule TAKE 1 CAPSULE EVERY DAY    escitalopram oxalate (LEXAPRO) 20 mg tablet TAKE 1 TABLET EVERY DAY    PREMARIN 0.625 mg/gram vaginal cream Insert  into vagina every Monday, Wednesday, Friday.  atorvastatin (LIPITOR) 80 mg tablet TAKE 1 TABLET EVERY DAY    losartan-hydrochlorothiazide (HYZAAR) 100-25 mg per tablet Take 1 Tab by mouth daily.  BENEFIBER, GUAR GUM, PO Take 2 Tabs by mouth daily.  gabapentin (NEURONTIN) 300 mg capsule Take 300 mg by mouth two (2) times a day. Indications: NEUROPATHIC PAIN    triamcinolone (NASACORT AQ) 55 mcg nasal inhaler 1 [place] by Both Nostrils route daily.     MULTIVITAMIN PO Take 1 Tab by mouth daily.    Cetirizine (ZYRTEC) 10 mg Cap Take 10 mg by mouth daily. Indications: ALLERGIC RHINITIS    pneumococcal 13 luis armando conj dip (PREVNAR 13, PF,) 0.5 mL syrg injection 0.5 mL by IntraMUSCular route PRIOR TO DISCHARGE for 1 dose. No current facility-administered medications for this visit. Medications Discontinued During This Encounter   Medication Reason    mupirocin calcium (BACTROBAN NASAL) 2 % nasal ointment Therapy Completed                 Bactroban Nasal Ointment discontinued because patient reports that she is not currently taking; reports therapy completed. Not A Current Medication; Therapy Completed. Med Rec updated. New medications at discharge include: Aspirin 325 mg (Start Taking on 2/2/17), Pepcid, Oxycodone, Miralax, Pericolace  Medication(s) changed at hospital discharge include: Tylenol  Medication(s) discontinued at hospital discharge include: Aspirin 81 mg tablet, Meloxicam, Menthol Topical Gel  Patient able to fill/pickup new medications without difficulty: Yes. Any Questions/Concerns regarding new Medication(s) and/or Medication Change(s): Denies any questions/concerns. - Home Health. Per patient, home health has not contacted her since hospital discharge. Advised patient to contact home health agency this afternoon; patient verbalizes understanding and NN provided patient with contact information for home health agency. Patient verbalizes understanding of discharge instructions that were provided to her upon discharge from [facility], including activity restrictions. Red Flags reviewed with patient and patient understands when to contact Dr. Chava Velazco' office versus use of EMS. Patient has been scheduled for a MOI appointment with Dr. Zulema Mendoza; patient verbalizes understanding of appointment information. Opportunity for patient to ask NN questions was provided. NN contact information provided to patient and patient advised to contact NN as needed.   Red Flags:  Warning signs: Please call your physician immediately at [phone] if you have  · Bleeding from incision that is constant. · Change in mental status (unusual behavior or confusion)  · If your incision develops redness or swelling  · Change in wound drainage (increase in amount, color, or foul odor)  · Temperature over 101.5 degrees Fahrenheit   · Pain in the calf of your leg  · Tenderness or redness in the calf of your leg  · Increased swelling of the thigh, ankle, calf, or foot.     Emergency: CALL 911 if you have  · Shortness of breath  · Chest pain  · Localized chest pain when coughing or taking a deep breath      PLAN    -Patient to attend Transitions Of Care Appointment with Dr. Braden Delacruz on 1/27/17.  -Patient to attend follow-up appointment(s) with Surgeon(if applicable) and/or Speciality Provider(s): Dr. Vilchis - to be scheduled by the patient.  -Patient to contact this NN and/or PCP office with any questions/concerns.  -Notified of availability of PCP on-call physician after-hours and on the weekends for non-emergent/non-life threatening medical questions/concerns; patient verbalizes understanding. Patient's currently scheduled future appointments are listed below. Future Appointments      Provider Martín Murray   1/27/2017 10:30 AM [facility]   4/4/2017 10:15 AM [facility]                 Patient expressed no questions, concerns or needs for this NN at this time. Patient verbalized understanding of all information discussed. NN contact information provided and patient advised to contact NN as needed. NN will route this encounter to Dr. Braden Delacruz for notification/review.

## 2017-01-27 ENCOUNTER — OFFICE VISIT (OUTPATIENT)
Dept: INTERNAL MEDICINE CLINIC | Age: 72
End: 2017-01-27

## 2017-01-27 VITALS
BODY MASS INDEX: 32.66 KG/M2 | TEMPERATURE: 97.7 F | HEIGHT: 61 IN | SYSTOLIC BLOOD PRESSURE: 129 MMHG | RESPIRATION RATE: 18 BRPM | DIASTOLIC BLOOD PRESSURE: 71 MMHG | HEART RATE: 66 BPM | WEIGHT: 173 LBS | OXYGEN SATURATION: 97 %

## 2017-01-27 DIAGNOSIS — I10 ESSENTIAL HYPERTENSION: Primary | Chronic | ICD-10-CM

## 2017-01-27 DIAGNOSIS — K59.00 CONSTIPATION, UNSPECIFIED CONSTIPATION TYPE: ICD-10-CM

## 2017-01-27 RX ORDER — ADHESIVE BANDAGE
30 BANDAGE TOPICAL DAILY
Qty: 180 ML | Refills: 1 | Status: SHIPPED | OUTPATIENT
Start: 2017-01-27 | End: 2017-08-08 | Stop reason: ALTCHOICE

## 2017-01-27 NOTE — PATIENT INSTRUCTIONS
Constipation: Care Instructions  Your Care Instructions  Constipation means that you have a hard time passing stools (bowel movements). People pass stools from 3 times a day to once every 3 days. What is normal for you may be different. Constipation may occur with pain in the rectum and cramping. The pain may get worse when you try to pass stools. Sometimes there are small amounts of bright red blood on toilet paper or the surface of stools. This is because of enlarged veins near the rectum (hemorrhoids). A few changes in your diet and lifestyle may help you avoid ongoing constipation. Your doctor may also prescribe medicine to help loosen your stool. Some medicines can cause constipation. These include pain medicines and antidepressants. Tell your doctor about all the medicines you take. Your doctor may want to make a medicine change to ease your symptoms. Follow-up care is a key part of your treatment and safety. Be sure to make and go to all appointments, and call your doctor if you are having problems. It's also a good idea to know your test results and keep a list of the medicines you take. How can you care for yourself at home? · Drink plenty of fluids, enough so that your urine is light yellow or clear like water. If you have kidney, heart, or liver disease and have to limit fluids, talk with your doctor before you increase the amount of fluids you drink. · Include high-fiber foods in your diet each day. These include fruits, vegetables, beans, and whole grains. · Get at least 30 minutes of exercise on most days of the week. Walking is a good choice. You also may want to do other activities, such as running, swimming, cycling, or playing tennis or team sports. · Take a fiber supplement, such as Citrucel or Metamucil, every day. Read and follow all instructions on the label. · Schedule time each day for a bowel movement. A daily routine may help.  Take your time having your bowel movement. · Support your feet with a small step stool when you sit on the toilet. This helps flex your hips and places your pelvis in a squatting position. · Your doctor may recommend an over-the-counter laxative to relieve your constipation. Examples are Milk of Magnesia and MiraLax. Read and follow all instructions on the label. Do not use laxatives on a long-term basis. When should you call for help? Call your doctor now or seek immediate medical care if:  · You have new or worse belly pain. · You have new or worse nausea or vomiting. · You have blood in your stools. Watch closely for changes in your health, and be sure to contact your doctor if:  · Your constipation is getting worse. · You do not get better as expected. Where can you learn more? Go to http://santo-juani.info/. Enter [phone] in the search box to learn more about \"Constipation: Care Instructions. \"  Current as of: May 27, 2016  Content Version: 11.1  © 2585-5063 [employer]. Care instructions adapted under license by [employer] (which disclaims liability or warranty for this information). If you have questions about a medical condition or this instruction, always ask your healthcare professional. [place] [address] any warranty or liability for your use of this information. Continue with colace and miralax. Add milk of mag.

## 2017-01-27 NOTE — PROGRESS NOTES
Reviewed record in preparation for visit and have obtained necessary documentation. Identified pt with two pt identifiers(name and ). Chief Complaint   Patient presents with    Other     MOI; left hip replacement on 17       Health Maintenance Due   Topic Date Due    OSTEOPOROSIS SCREENING (DEXA)  2009    GLAUCOMA SCREENING Q2Y  2016    MEDICARE YEARLY EXAM  2016       Ms. Isidro Olivarez has a reminder for a \"due or due soon\" health maintenance. I have asked that she discuss health maintenance topic(s) due with Her  primary care provider. Coordination of Care Questionnaire:  :     1) Have you been to an emergency room, urgent care clinic since your last visit? no   Hospitalized since your last visit? yes             2) Have you seen or consulted any other health care providers outside of [address] [place] [address] since your last visit? no  (Include any pap smears or colon screenings in this section.)      Patient is accompanied by self I have received verbal consent from Ibrahima Petersen to discuss any/all medical information while they are present in the room.

## 2017-01-27 NOTE — MR AVS SNAPSHOT
Visit Information Date & Time Provider Department Dept. Phone Encounter #  
 1/27/2017 10:30 AM Burt Wray, [address] [phone] [id number] Follow-up Instructions Return in about 3 months (around 4/27/2017). Your Appointments 4/4/2017 10:15 AM  
ROUTINE CARE with DO MANAV Powers III [facility] [place] [address]) Appt Note: 3 month follow up  
 [facility] [address] [id number]  
[address] [facility] [address] [place] [address] Upcoming Health Maintenance Date Due OSTEOPOROSIS SCREENING (DEXA) 3/1/2009 GLAUCOMA SCREENING Q2Y 11/6/2016 MEDICARE YEARLY EXAM 11/9/2016 BREAST CANCER SCRN MAMMOGRAM 11/5/2017 DTaP/Tdap/Td series (2 - Td) 8/4/2026 Allergies as of 1/27/2017  Review Complete On: 1/27/2017 By: Shivam Serrano III, DO Severity Noted Reaction Type Reactions Tape [Adhesive] Medium 04/30/2013   Topical Rash Bandaids: redness Ciprofloxacin  11/09/2015   Not Verified Diarrhea And dizziness Pcn [Penicillins]  11/06/2009    Other (comments) \"whelps\" Prednisone  11/21/2012    Other (comments) Terrible downer Current Immunizations  Reviewed on 8/31/2015 Name Date Hepatitis A Vaccine 6/1/2009 Hepatitis B Vaccine 5/29/2009 Influenza Vaccine 10/1/2016, 10/6/2014, 9/18/2013 Influenza Vaccine (Quad) PF 8/31/2015 Influenza Vaccine Split 10/1/2011 Pneumococcal Vaccine (Unspecified Type) 9/7/2012 TD Vaccine 6/1/2009 Tdap 8/4/2016  5:21 PM  
  
 Not reviewed this visit You Were Diagnosed With   
  
 Codes Comments Essential hypertension    -  Primary ICD-10-CM: I10 
ICD-9-CM: 401.9 Constipation, unspecified constipation type     ICD-10-CM: K59.00 ICD-9-CM: 564.00 Vitals BP Pulse Temp Resp Height(growth percentile) Weight(growth percentile) 129/71 (BP 1 Location: Left arm, BP Patient Position: Sitting) 66 97.7 °F (36.5 °C) (Oral) 18 5' 1\" (1.549 m) 173 lb (78.5 kg) LMP SpO2 BMI OB Status Smoking Status (LMP Unknown) 97% 32.69 kg/m2 Hysterectomy Never Smoker Vitals History BMI and BSA Data Body Mass Index Body Surface Area  
 32.69 kg/m 2 1.84 m 2 Preferred Pharmacy Pharmacy Name Phone [facility] [phone], [address] [place] [address] [phone] Your Updated Medication List  
  
   
This list is accurate as of: 1/27/17 11:19 AM.  Always use your most recent med list.  
  
  
  
  
 acetaminophen 325 mg tablet Commonly known as:  TYLENOL Take 2 Tabs by mouth every six (6) hours for 14 days. aspirin 325 mg tablet Commonly known as:  ASPIRIN Take 1 Tab by mouth two (2) times a day for 30 days. To begin after you have completing 2 weeks of Coumadin Start taking on:  2/2/2017  
  
 atorvastatin 80 mg tablet Commonly known as:  LIPITOR  
TAKE 1 TABLET EVERY DAY  
  
 BENEFIBER (GUAR GUM) PO Take 2 Tabs by mouth daily. CARTIA  mg ER capsule Generic drug:  dilTIAZem CD  
TAKE 1 CAPSULE EVERY DAY  
  
 doxazosin 2 mg tablet Commonly known as:  CARDURA Take 2 mg by mouth nightly. escitalopram oxalate 20 mg tablet Commonly known as:  Braulio Barban TAKE 1 TABLET EVERY DAY  
  
 famotidine 20 mg tablet Commonly known as:  PEPCID Take 1 Tab by mouth two (2) times a day for 30 days. gabapentin 300 mg capsule Commonly known as:  NEURONTIN Take 300 mg by mouth two (2) times a day. Indications: NEUROPATHIC PAIN  
  
 losartan-hydroCHLOROthiazide 100-25 mg per tablet Commonly known as:  HYZAAR Take 1 Tab by mouth daily. magnesium hydroxide 400 mg/5 mL suspension Commonly known as:  MILK OF MAGNESIA Take 30 mL by mouth daily. Indications: Constipation  MULTIVITAMIN PO  
 Take 1 Tab by mouth daily. NASACORT AQ 55 mcg nasal inhaler Generic drug:  triamcinolone 1 Crawford by Both Nostrils route daily. oxyCODONE IR 5 mg immediate release tablet Commonly known as:  Madalyn Ramal Take 1-2 Tabs by mouth every three (3) hours as needed. Max Daily Amount: 80 mg.  
  
 pneumococcal 13 luis armando conj dip 0.5 mL Syrg injection Commonly known as:  PREVNAR 13 (PF)  
0.5 mL by IntraMUSCular route PRIOR TO DISCHARGE for 1 dose. polyethylene glycol 17 gram packet Commonly known as:  Angela Jose Guadalupe Take 1 Packet by mouth daily as needed (constipation) for up to 15 days. PREMARIN 0.625 mg/gram vaginal cream  
Generic drug:  conjugated estrogens Insert  into vagina every Monday, Wednesday, Friday. senna-docusate 8.6-50 mg per tablet Commonly known as:  Tanvir Edward Take 1 Tab by mouth daily. ZyrTEC 10 mg Cap Generic drug:  Cetirizine Take 10 mg by mouth daily. Indications: ALLERGIC RHINITIS Prescriptions Sent to Pharmacy Refills  
 magnesium hydroxide (MILK OF MAGNESIA) 400 mg/5 mL suspension 1 Sig: Take 30 mL by mouth daily. Indications: Constipation Class: Normal  
 [facility] [address] [place] [address] [place] [address] Ph #: [phone] Route: Oral  
  
Follow-up Instructions Return in about 3 months (around 4/27/2017). Patient Instructions Constipation: Care Instructions Your Care Instructions Constipation means that you have a hard time passing stools (bowel movements). People pass stools from 3 times a day to once every 3 days. What is normal for you may be different. Constipation may occur with pain in the rectum and cramping. The pain may get worse when you try to pass stools. Sometimes there are small amounts of bright red blood on toilet paper or the surface of stools. This is because of enlarged veins near the rectum (hemorrhoids). A few changes in your diet and lifestyle may help you avoid ongoing constipation. Your doctor may also prescribe medicine to help loosen your stool. Some medicines can cause constipation. These include pain medicines and antidepressants. Tell your doctor about all the medicines you take. Your doctor may want to make a medicine change to ease your symptoms. Follow-up care is a key part of your treatment and safety. Be sure to make and go to all appointments, and call your doctor if you are having problems. It's also a good idea to know your test results and keep a list of the medicines you take. How can you care for yourself at home? · Drink plenty of fluids, enough so that your urine is light yellow or clear like water. If you have kidney, heart, or liver disease and have to limit fluids, talk with your doctor before you increase the amount of fluids you drink. · Include high-fiber foods in your diet each day. These include fruits, vegetables, beans, and whole grains. · Get at least 30 minutes of exercise on most days of the week. Walking is a good choice. You also may want to do other activities, such as running, swimming, cycling, or playing tennis or team sports. · Take a fiber supplement, such as Citrucel or Metamucil, every day. Read and follow all instructions on the label. · Schedule time each day for a bowel movement. A daily routine may help. Take your time having your bowel movement. · Support your feet with a small step stool when you sit on the toilet. This helps flex your hips and places your pelvis in a squatting position. · Your doctor may recommend an over-the-counter laxative to relieve your constipation. Examples are Milk of Magnesia and MiraLax. Read and follow all instructions on the label. Do not use laxatives on a long-term basis. When should you call for help? Call your doctor now or seek immediate medical care if: 
· You have new or worse belly pain. · You have new or worse nausea or vomiting. · You have blood in your stools. Watch closely for changes in your health, and be sure to contact your doctor if: 
· Your constipation is getting worse. · You do not get better as expected. Where can you learn more? Go to http://santo-juani.info/. Enter [phone] in the search box to learn more about \"Constipation: Care Instructions. \" Current as of: May 27, 2016 Content Version: 11.1 © 3539-1413 [employer]. Care instructions adapted under license by [employer] (which disclaims liability or warranty for this information). If you have questions about a medical condition or this instruction, always ask your healthcare professional. [address] any warranty or liability for your use of this information. Continue with colace and miralax. Add milk of mag. Introducing [facility]! Noman Castrejon introduces [employer] patient portal. Now you can access parts of your medical record, email your doctor's office, and request medication refills online. 1. In your internet browser, go to [email or web address] 2. Click on the First Time User? Click Here link in the Sign In box. You will see the New Member Sign Up page. 3. Enter your [employer] Access Code exactly as it appears below. You will not need to use this code after youve completed the sign-up process. If you do not sign up before the expiration date, you must request a new code. · [employer] Access Code: [email or web address] Expires: 4/3/2017 10:15 AM 
 
4. Enter the last four digits of your Social Security Number (xxxx) and Date of Birth (mm/dd/yyyy) as indicated and click Submit. You will be taken to the next sign-up page. 5. Create a GOOMt ID. This will be your [employer] login ID and cannot be changed, so think of one that is secure and easy to remember. 6. Create a [employer] password. You can change your password at any time. 7. Enter your Password Reset Question and Answer. This can be used at a later time if you forget your password. 8. Enter your e-mail address. You will receive e-mail notification when new information is available in [address] 9. Click Sign Up. You can now view and download portions of your medical record. 10. Click the Download Summary menu link to download a portable copy of your medical information. If you have questions, please visit the Frequently Asked Questions section of the [employer] website. Remember, [employer] is NOT to be used for urgent needs. For medical emergencies, dial 911. Now available from your iPhone and Android! Please provide this summary of care documentation to your next provider. Your primary care clinician is listed as Antonio Bustamante If you have any questions after today's visit, please call [phone].

## 2017-01-27 NOTE — PROGRESS NOTES
Rosalia Harmon is a 66 y.o. female who presents for evaluation of transition [facility]. [facility]. Doing ok, though is constipated. Already using colace and miralax. ROS:  Constitutional: negative for fevers, chills, anorexia and weight loss  Eyes:   negative for visual disturbance and irritation  ENT:   negative for tinnitus,sore throat,nasal congestion,ear pain,hoarseness  Respiratory:  negative for cough, hemoptysis, dyspnea,wheezing  CV:   negative for chest pain, palpitations, lower extremity edema  GI:   negative for nausea, vomiting, diarrhea, abdominal pain,melena  Genitourinary: negative for frequency, dysuria and hematuria  Musculoskel: negative for myalgias, arthralgias, back pain, muscle weakness, joint pain  Neurological:  negative for headaches, dizziness, focal weakness, numbness  Psychiatric:     Negative for depression or anxiety      Past Medical History   Diagnosis Date    Asthma      mild, uses inhaler prn only. No problems x > 1 year    Broken wrist 11/20/2013     right wrist    CAD (coronary artery disease) 5753,8105     stents. x2 Currently asymptomatic & exercises 5x/ week    Cancer ([facility] [address]      skin, mole removed    DDD (degenerative disc disease)     DJD (degenerative joint disease)     Environmental allergies     Hypercholesterolemia     Hypertension     Osteopenia     Recurrent UTI        Past Surgical History   Procedure Laterality Date    Endoscopy, colon, diagnostic  7/15//2005     neg; 10 year repeat; Dr. Alexander Copeland Hx appendectomy      Hx cholecystectomy      Hx jimi and bso      Colonoscopy,diagnostic  11/20/2015      tics; no polyps; Dr. Jenni Spurling; no follow up for screening    Hx heart catheterization  2009 1 stent : 2009 R coronary    Hx heart catheterization       LAD stendted 5/2012    Hx orthopaedic  2/2011     right RCR     Hx wrist fracture tx Right 5/2013     ORIF    Hx orthopaedic  1/14/14     L4-5 DECOMPRESSION AND FUSION    Hx orthopaedic       r toe, bone removal    Hx orthopaedic Right 2/2015     THR    Hx tonsillectomy      Hx tympanostomy  02/2013     in Right ear    Hx hip replacement Left 01/17/2017       Family History   Problem Relation Age of Onset    Elevated Lipids Mother     Hypertension Father     Heart Disease Father     Stroke Father     Other Brother      AAA    Cancer Brother      brain       Social History     Social History    Marital status:      Spouse name: N/A    Number of children: N/A    Years of education: N/A     Occupational History    Not on file. Social History Main Topics    Smoking status: Never Smoker    Smokeless tobacco: Never Used    Alcohol use 0.0 oz/week      Comment: Social      Drug use: No    Sexual activity: Yes     Partners: Male     Birth control/ protection: None     Other Topics Concern    Not on file     Social History Narrative            Visit Vitals    /71 (BP 1 Location: Left arm, BP Patient Position: Sitting)    Pulse 66    Temp 97.7 °F (36.5 °C) (Oral)    Resp 18    Ht 5' 1\" (1.549 m)    Wt 173 lb (78.5 kg)    LMP  (LMP Unknown)    SpO2 97%    BMI 32.69 kg/m2       Physical Examination:   General - Well appearing female  HEENT - PERRL, TM no erythema/opacification, normal nasal turbinates, no oropharyngeal erythema or exudate, MMM  Neck - supple, no bruits, no thyroidomegaly, no lymphadenopathy  Pulm - clear to auscultation bilaterally  Cardio - RRR, normal S1 S2, no murmur  Abd - soft, nontender, no masses, no HSM  Extrem - no edema, +2 distal pulses  Neuro-  No focal deficits, CN intact     Assessment/Plan:    1. Constipation--add milk of mag. Continue with colace and miralax  2.  htn--good control with cartia and hyzaar  3. Left hip oa--sp MALIKA. Has follow up with dr murphy later today. On asa 325 bid now.     rtc 3 months        Aung Rod III, DO

## 2017-02-10 RX ORDER — LOSARTAN POTASSIUM AND HYDROCHLOROTHIAZIDE 25; 100 MG/1; MG/1
TABLET ORAL
Qty: 90 TAB | Refills: 3 | Status: SHIPPED | OUTPATIENT
Start: 2017-02-10 | End: 2018-02-17 | Stop reason: SDUPTHER

## 2017-03-08 ENCOUNTER — TELEPHONE (OUTPATIENT)
Dept: INTERNAL MEDICINE CLINIC | Age: 72
End: 2017-03-08

## 2017-03-08 NOTE — TELEPHONE ENCOUNTER
Pt states she has swelling & discomfort in arms, calves and legs. She has discomfort in chest and that is bothering her. Pt would like to be seen tomorrow.

## 2017-03-10 ENCOUNTER — OFFICE VISIT (OUTPATIENT)
Dept: INTERNAL MEDICINE CLINIC | Age: 72
End: 2017-03-10

## 2017-03-10 VITALS
WEIGHT: 164 LBS | SYSTOLIC BLOOD PRESSURE: 144 MMHG | BODY MASS INDEX: 30.96 KG/M2 | HEIGHT: 61 IN | HEART RATE: 71 BPM | TEMPERATURE: 98 F | OXYGEN SATURATION: 98 % | DIASTOLIC BLOOD PRESSURE: 80 MMHG | RESPIRATION RATE: 16 BRPM

## 2017-03-10 DIAGNOSIS — E78.00 PURE HYPERCHOLESTEROLEMIA: Chronic | ICD-10-CM

## 2017-03-10 DIAGNOSIS — F32.A ANXIETY AND DEPRESSION: ICD-10-CM

## 2017-03-10 DIAGNOSIS — M81.0 POST-MENOPAUSAL OSTEOPOROSIS: ICD-10-CM

## 2017-03-10 DIAGNOSIS — R07.9 CHEST PAIN, UNSPECIFIED TYPE: Primary | ICD-10-CM

## 2017-03-10 DIAGNOSIS — I10 ESSENTIAL HYPERTENSION: Chronic | ICD-10-CM

## 2017-03-10 DIAGNOSIS — F41.9 ANXIETY AND DEPRESSION: ICD-10-CM

## 2017-03-10 DIAGNOSIS — I25.10 ATHEROSCLEROSIS OF NATIVE CORONARY ARTERY OF NATIVE HEART WITHOUT ANGINA PECTORIS: Chronic | ICD-10-CM

## 2017-03-10 DIAGNOSIS — M16.12 PRIMARY OSTEOARTHRITIS OF LEFT HIP: ICD-10-CM

## 2017-03-10 RX ORDER — TRAMADOL HYDROCHLORIDE 50 MG/1
TABLET ORAL
Refills: 0 | COMMUNITY
Start: 2017-02-27 | End: 2017-07-19

## 2017-03-10 RX ORDER — MELOXICAM 15 MG/1
TABLET ORAL
COMMUNITY
Start: 2017-02-24 | End: 2017-08-23 | Stop reason: SDUPTHER

## 2017-03-10 RX ORDER — FUROSEMIDE 40 MG/1
40 TABLET ORAL
Qty: 30 TAB | Refills: 1 | Status: SHIPPED | OUTPATIENT
Start: 2017-03-10 | End: 2017-03-10 | Stop reason: SDUPTHER

## 2017-03-10 RX ORDER — ESCITALOPRAM OXALATE 10 MG/1
10 TABLET ORAL DAILY
Qty: 90 TAB | Refills: 3 | Status: SHIPPED | OUTPATIENT
Start: 2017-03-10

## 2017-03-10 RX ORDER — FUROSEMIDE 40 MG/1
TABLET ORAL
Qty: 90 TAB | Refills: 1 | Status: SHIPPED | OUTPATIENT
Start: 2017-03-10 | End: 2019-10-14

## 2017-03-10 NOTE — PROGRESS NOTES
Reviewed record in preparation for visit and have obtained necessary documentation. Identified pt with two pt identifiers(name and ). Chief Complaint   Patient presents with    Leg Swelling     bilateral    Hand Swelling     bilateral    Foot Swelling     bilateral    Chest Pain     last week    Flank Pain     left       Health Maintenance Due   Topic Date Due    OSTEOPOROSIS SCREENING (DEXA)  2009    GLAUCOMA SCREENING Q2Y  2016    MEDICARE YEARLY EXAM  2016       Ms. Robbin Rao has a reminder for a \"due or due soon\" health maintenance. I have asked that she discuss health maintenance topic(s) due with Her  primary care provider. Coordination of Care Questionnaire:  :     1) Have you been to an emergency room, urgent care clinic since your last visit? no   Hospitalized since your last visit? no             2) Have you seen or consulted any other health care providers outside of [address] [place] [address] since your last visit? no  (Include any pap smears or colon screenings in this section.)      Patient is accompanied by self I have received verbal consent from Himanshu Aburto to discuss any/all medical information while they are present in the room.

## 2017-03-10 NOTE — PROGRESS NOTES
Maciej Olmstead is a 67 y.o. female who presents for evaluation of bilateral leg edema. Onset last week, comes and goes. Does not have any today. Also having increased anxiety, as we stopped her lexapro at last visit, on jan 27. She is also currently getting ready to file for divorce. Had complete cardiac workup done with dr Robert Dangelo in January that was normal.      ROS:  Constitutional: negative for fevers, chills, anorexia and weight loss  Eyes:   negative for visual disturbance and irritation  ENT:   negative for tinnitus,sore throat,nasal congestion,ear pain,hoarseness  Respiratory:  negative for cough, hemoptysis, dyspnea,wheezing  CV:   negative for chest pain, palpitations. ++ lower extremity edema  GI:   negative for nausea, vomiting, diarrhea, abdominal pain,melena  Genitourinary: negative for frequency, dysuria and hematuria  Musculoskel: negative for myalgias, arthralgias, back pain, muscle weakness, joint pain  Neurological:  negative for headaches, dizziness, focal weakness, numbness  Psychiatric:     ++ for depression or anxiety      Past Medical History:   Diagnosis Date    Asthma     mild, uses inhaler prn only. No problems x > 1 year    Broken wrist 11/20/2013    right wrist    CAD (coronary artery disease) 7891,4809    stents. x2 Currently asymptomatic & exercises 5x/ week    Cancer ([facility] [address]     skin, mole removed    DDD (degenerative disc disease)     DJD (degenerative joint disease)     Environmental allergies     Hypercholesterolemia     Hypertension     Osteopenia     Recurrent UTI        Past Surgical History:   Procedure Laterality Date    COLONOSCOPY,DIAGNOSTIC  11/20/2015     tics; no polyps; Dr. Tello More; no follow up for screening    ENDOSCOPY, COLON, DIAGNOSTIC  7/15//2005    neg; 10 year repeat; Dr. Olya Mishra  2009    1 stent : 2009 R coronary    HX HEART CATHETERIZATION      LAD stendted 5/2012    HX HIP REPLACEMENT Left 01/17/2017    HX ORTHOPAEDIC  2/2011    right RCR     HX ORTHOPAEDIC  1/14/14    L4-5 DECOMPRESSION AND FUSION    HX ORTHOPAEDIC      r toe, bone removal    HX ORTHOPAEDIC Right 2/2015    THR    HX SHITAL AND BSO      HX TONSILLECTOMY      HX TYMPANOSTOMY  02/2013    in Right ear    HX WRIST FRACTURE TX Right 5/2013    ORIF       Family History   Problem Relation Age of Onset    Elevated Lipids Mother     Hypertension Father     Heart Disease Father     Stroke Father     Other Brother      AAA    Cancer Brother      brain       Social History     Social History    Marital status:      Spouse name: N/A    Number of children: N/A    Years of education: N/A     Occupational History    Not on file. Social History Main Topics    Smoking status: Never Smoker    Smokeless tobacco: Never Used    Alcohol use 0.0 oz/week      Comment: Social      Drug use: No    Sexual activity: Yes     Partners: Male     Birth control/ protection: None     Other Topics Concern    Not on file     Social History Narrative            Visit Vitals    /80 (BP 1 Location: Left arm, BP Patient Position: Sitting)    Pulse 71    Temp 98 °F (36.7 °C) (Oral)    Resp 16    Ht 5' 1\" (1.549 m)    Wt 164 lb (74.4 kg)    LMP  (LMP Unknown)    SpO2 98%    BMI 30.99 kg/m2       Physical Examination:   General - Well appearing female  HEENT - PERRL, TM no erythema/opacification, normal nasal turbinates, no oropharyngeal erythema or exudate, MMM  Neck - supple, no bruits, no thyroidomegaly, no lymphadenopathy  Pulm - clear to auscultation bilaterally  Cardio - RRR, normal S1 S2, no murmur  Abd - soft, nontender, no masses, no HSM  Extrem - no edema, +2 distal pulses--no edema today at all  Neuro-  No focal deficits, CN intact     Assessment/Plan:    1. Transient edema--rx given for prn lasix. Had negative cardiac workup few months ago. No need to repeat now.   Has seen dr Bethany Muñiz in past if needed. 2.  Anxiety and depression--resume lexapro  3.  htn--continue hyzaar and cartia  4. Cad--on asa  5. Prediabetes  6. Routine adult health maintenance--dexa scan ordered    rtc for regular visit.         Aung Dinero III, DO

## 2017-03-10 NOTE — MR AVS SNAPSHOT
Visit Information Date & Time Provider Department Dept. Phone Encounter #  
 3/10/2017 12:00 PM Zuleika Rader, [address] [phone] Follow-up Instructions Return if symptoms worsen or fail to improve. Your Appointments 4/4/2017 10:15 AM  
ROUTINE CARE with DO MANAV Walton III [facility]) Appt Note: 3 month follow up  
 [facility] [address] [id number]  
[address] [facility] [address] [place] [address] Upcoming Health Maintenance Date Due OSTEOPOROSIS SCREENING (DEXA) 3/1/2009 GLAUCOMA SCREENING Q2Y 11/6/2016 MEDICARE YEARLY EXAM 11/9/2016 BREAST CANCER SCRN MAMMOGRAM 11/5/2017 DTaP/Tdap/Td series (2 - Td) 8/4/2026 Allergies as of 3/10/2017  Review Complete On: 3/10/2017 By: Celso Damian III, DO Severity Noted Reaction Type Reactions Tape [Adhesive] Medium 04/30/2013   Topical Rash Bandaids: redness Ciprofloxacin  11/09/2015   Not Verified Diarrhea And dizziness Pcn [Penicillins]  11/06/2009    Other (comments) \"whelps\" Prednisone  11/21/2012    Other (comments) Terrible downer Current Immunizations  Reviewed on 8/31/2015 Name Date Hepatitis A Vaccine 6/1/2009 Hepatitis B Vaccine 5/29/2009 Influenza Vaccine 10/1/2016, 10/6/2014, 9/18/2013 Influenza Vaccine (Quad) PF 8/31/2015 Influenza Vaccine Split 10/1/2011 Pneumococcal Vaccine (Unspecified Type) 9/7/2012 TD Vaccine 6/1/2009 Tdap 8/4/2016  5:21 PM  
  
 Not reviewed this visit You Were Diagnosed With   
  
 Codes Comments Chest pain, unspecified type    -  Primary ICD-10-CM: R07.9 ICD-9-CM: 786.50 Essential hypertension     ICD-10-CM: I10 
ICD-9-CM: 401.9 Atherosclerosis of native coronary artery of native heart without angina pectoris     ICD-10-CM: I25.10 ICD-9-CM: 414.01   
 Pure hypercholesterolemia     ICD-10-CM: E78.00 ICD-9-CM: 272.0 Anxiety and depression     ICD-10-CM: F41.9, F32.9 ICD-9-CM: 300.00, 311 Primary osteoarthritis of left hip     ICD-10-CM: M16.12 
ICD-9-CM: 715.15 Post-menopausal osteoporosis     ICD-10-CM: M81.0 ICD-9-CM: 733.01 Vitals BP Pulse Temp Resp Height(growth percentile) Weight(growth percentile) 144/80 (BP 1 Location: Left arm, BP Patient Position: Sitting) 71 98 °F (36.7 °C) (Oral) 16 5' 1\" (1.549 m) 164 lb (74.4 kg) LMP SpO2 BMI OB Status Smoking Status (LMP Unknown) 98% 30.99 kg/m2 Hysterectomy Never Smoker Vitals History BMI and BSA Data Body Mass Index Body Surface Area 30.99 kg/m 2 1.79 m 2 Preferred Pharmacy Pharmacy Name Phone [place] [phone] - [address] , [address] AT [place] [address] [phone] Your Updated Medication List  
  
   
This list is accurate as of: 3/10/17  1:03 PM.  Always use your most recent med list.  
  
  
  
  
 atorvastatin 80 mg tablet Commonly known as:  LIPITOR  
TAKE 1 TABLET EVERY DAY  
  
 BENEFIBER (GUAR GUM) PO Take 2 Tabs by mouth daily. CARTIA  mg ER capsule Generic drug:  dilTIAZem CD  
TAKE 1 CAPSULE EVERY DAY  
  
 doxazosin 2 mg tablet Commonly known as:  CARDURA Take 2 mg by mouth nightly. escitalopram oxalate 10 mg tablet Commonly known as:  Mary Levo Take 1 Tab by mouth daily. furosemide 40 mg tablet Commonly known as:  LASIX Take 1 Tab by mouth daily as needed. Indications: Edema  
  
 gabapentin 300 mg capsule Commonly known as:  NEURONTIN Take 300 mg by mouth two (2) times a day. Indications: NEUROPATHIC PAIN  
  
 losartan-hydroCHLOROthiazide 100-25 mg per tablet Commonly known as:  HYZAAR  
TAKE 1 TABLET EVERY DAY  
  
 magnesium hydroxide 400 mg/5 mL suspension Commonly known as:  MILK OF MAGNESIA Take 30 mL by mouth daily. Indications: Constipation  
  
 meloxicam 15 mg tablet Commonly known as:  MOBIC  
  
 MULTIVITAMIN PO Take 1 Tab by mouth daily. NASACORT AQ 55 mcg nasal inhaler Generic drug:  triamcinolone 1 [place] by Both Nostrils route daily. PREMARIN 0.625 mg/gram vaginal cream  
Generic drug:  conjugated estrogens Insert  into vagina every Monday, Wednesday, Friday. senna-docusate 8.6-50 mg per tablet Commonly known as:  Wagner Gwendolyn Take 1 Tab by mouth daily. traMADol 50 mg tablet Commonly known as:  ULTRAM  
TK 1 TO 2 TS PO Q 4 TO 6 H PRN P  
  
 ZyrTEC 10 mg Cap Generic drug:  Cetirizine Take 10 mg by mouth daily. Indications: ALLERGIC RHINITIS Prescriptions Sent to Pharmacy Refills  
 escitalopram oxalate (LEXAPRO) 10 mg tablet 3 Sig: Take 1 Tab by mouth daily. Class: Normal  
 Pharmacy: [address] [place] [address], [address] [place] [address] Ph #: [phone] Route: Oral  
 furosemide (LASIX) 40 mg tablet 1 Sig: Take 1 Tab by mouth daily as needed. Indications: Edema Class: Normal  
 Pharmacy: [facility] [address] [place] [address] Ph #: [phone] Route: Oral  
  
We Performed the Following AMB POC EKG ROUTINE W/ 12 LEADS, INTER & REP [78946 CPT(R)] Follow-up Instructions Return if symptoms worsen or fail to improve. To-Do List   
 03/10/2017 Imaging:  DEXA BONE DENSITY STUDY AXIAL Patient Instructions DASH Diet: Care Instructions Your Care Instructions The DASH diet is an eating plan that can help lower your blood pressure. DASH stands for Dietary Approaches to Stop Hypertension. Hypertension is high blood pressure. The DASH diet focuses on eating foods that are high in calcium, potassium, and magnesium. These nutrients can lower blood pressure.  The foods that are highest in these nutrients are fruits, vegetables, low-fat dairy products, nuts, seeds, and legumes. But taking calcium, potassium, and magnesium supplements instead of eating foods that are high in those nutrients does not have the same effect. The DASH diet also includes whole grains, fish, and poultry. The DASH diet is one of several lifestyle changes your doctor may recommend to lower your high blood pressure. Your doctor may also want you to decrease the amount of sodium in your diet. Lowering sodium while following the DASH diet can lower blood pressure even further than just the DASH diet alone. Follow-up care is a key part of your treatment and safety. Be sure to make and go to all appointments, and call your doctor if you are having problems. It's also a good idea to know your test results and keep a list of the medicines you take. How can you care for yourself at home? Following the DASH diet · Eat 4 to 5 servings of fruit each day. A serving is 1 medium-sized piece of fruit, ½ cup chopped or canned fruit, 1/4 cup dried fruit, or 4 ounces (½ cup) of fruit juice. Choose fruit more often than fruit juice. · Eat 4 to 5 servings of vegetables each day. A serving is 1 cup of lettuce or raw leafy vegetables, ½ cup of chopped or cooked vegetables, or 4 ounces (½ cup) of vegetable juice. Choose vegetables more often than vegetable juice. · Get 2 to 3 servings of low-fat and fat-free dairy each day. A serving is 8 ounces of milk, 1 cup of yogurt, or 1 ½ ounces of cheese. · Eat 6 to 8 servings of grains each day. A serving is 1 slice of bread, 1 ounce of dry cereal, or ½ cup of cooked rice, pasta, or cooked cereal. Try to choose whole-grain products as much as possible. · Limit lean meat, poultry, and fish to 2 servings each day. A serving is 3 ounces, about the size of a deck of cards.  
· Eat 4 to 5 servings of nuts, seeds, and legumes (cooked dried beans, lentils, and split peas) each week. A serving is 1/3 cup of nuts, 2 tablespoons of seeds, or ½ cup of cooked beans or peas. · Limit fats and oils to 2 to 3 servings each day. A serving is 1 teaspoon of vegetable oil or 2 tablespoons of salad dressing. · Limit sweets and added sugars to 5 servings or less a week. A serving is 1 tablespoon jelly or jam, ½ cup sorbet, or 1 cup of lemonade. · Eat less than 2,300 milligrams (mg) of sodium a day. If you limit your sodium to 1,500 mg a day, you can lower your blood pressure even more. Tips for success · Start small. Do not try to make dramatic changes to your diet all at once. You might feel that you are missing out on your favorite foods and then be more likely to not follow the plan. Make small changes, and stick with them. Once those changes become habit, add a few more changes. · Try some of the following: ¨ Make it a goal to eat a fruit or vegetable at every meal and at snacks. This will make it easy to get the recommended amount of fruits and vegetables each day. ¨ Try yogurt topped with fruit and nuts for a snack or healthy dessert. ¨ Add lettuce, tomato, cucumber, and onion to sandwiches. ¨ Combine a ready-made pizza crust with low-fat mozzarella cheese and lots of vegetable toppings. Try using tomatoes, squash, spinach, broccoli, carrots, cauliflower, and onions. ¨ Have a variety of cut-up vegetables with a low-fat dip as an appetizer instead of chips and dip. ¨ Sprinkle sunflower seeds or chopped almonds over salads. Or try adding chopped walnuts or almonds to cooked vegetables. ¨ Try some vegetarian meals using beans and peas. Add garbanzo or kidney beans to salads. Make burritos and tacos with mashed mcnally beans or black beans. Where can you learn more? Go to http://santo-juani.info/. Enter L636 in the search box to learn more about \"DASH Diet: Care Instructions. \" Current as of: March 23, 2016 Content Version: 11.1 © 1360-7537 Song[employer]. Care instructions adapted under license by [employer] (which disclaims liability or warranty for this information). If you have questions about a medical condition or this instruction, always ask your healthcare professional. [address] any warranty or liability for your use of this information. Leg and Ankle Edema: Care Instructions Your Care Instructions Swelling in the legs, ankles, and feet is called edema. It is common after you sit or stand for a while. Long plane flights or car rides often cause swelling in the legs and feet. You may also have swelling if you have to stand for long periods of time at your job. Problems with the veins in the legs (varicose veins) and changes in hormones can also cause swelling. Sometimes the swelling in the ankles and feet is caused by a more serious problem, such as heart failure, infection, blood clots, or liver or kidney disease. Follow-up care is a key part of your treatment and safety. Be sure to make and go to all appointments, and call your doctor if you are having problems. Its also a good idea to know your test results and keep a list of the medicines you take. How can you care for yourself at home? · If your doctor gave you medicine, take it as prescribed. Call your doctor if you think you are having a problem with your medicine. · Whenever you are resting, raise your legs up. Try to keep the swollen area higher than the level of your heart. · Take breaks from standing or sitting in one position. ¨ Walk around to increase the blood flow in your lower legs. ¨ Move your feet and ankles often while you stand, or tighten and relax your leg muscles. · Wear support stockings. Put them on in the morning, before swelling gets worse. · Eat a balanced diet. Lose weight if you need to. · Limit the amount of salt (sodium) in your diet.  Salt holds fluid in the body and may increase swelling. When should you call for help? Call 911 anytime you think you may need emergency care. For example, call if: 
· You have symptoms of a blood clot in your lung (called a pulmonary embolism). These may include: 
¨ Sudden chest pain. ¨ Trouble breathing. ¨ Coughing up blood. Call your doctor now or seek immediate medical care if: 
· You have signs of a blood clot, such as: 
¨ Pain in your calf, back of the knee, thigh, or groin. ¨ Redness and swelling in your leg or groin. · You have symptoms of infection, such as: 
¨ Increased pain, swelling, warmth, or redness. ¨ Red streaks or pus. ¨ A fever. Watch closely for changes in your health, and be sure to contact your doctor if: 
· Your swelling is getting worse. · You have new or worsening pain in your legs. · You do not get better as expected. Where can you learn more? Go to http://santo-juani.info/. Enter B125 in the search box to learn more about \"Leg and Ankle Edema: Care Instructions. \" Current as of: May 27, 2016 Content Version: 11.1 © 2598-3553 Listen Up. Care instructions adapted under license by [employer] (which disclaims liability or warranty for this information). If you have questions about a medical condition or this instruction, always ask your healthcare professional. [address] any warranty or liability for your use of this information. Try to weigh yourself every morning. That can also help gauge how much weight/fluid you have gained. Introducing [facility]! Jose Johnson introduces Electronic Payment and Services (EPS) patient portal. Now you can access parts of your medical record, email your doctor's office, and request medication refills online. 1. In your internet browser, go to [email or web address] 2. Click on the First Time User? Click Here link in the Sign In box. You will see the New Member Sign Up page. 3. Enter your [facility] Access Code exactly as it appears below. You will not need to use this code after youve completed the sign-up process. If you do not sign up before the expiration date, you must request a new code. · [facility] Access Code: [email or web address] Expires: 4/3/2017 10:15 AM 
 
4. Enter the last four digits of your Social Security Number (xxxx) and Date of Birth (mm/dd/yyyy) as indicated and click Submit. You will be taken to the next sign-up page. 5. Create a [facility] ID. This will be your [facility] login ID and cannot be changed, so think of one that is secure and easy to remember. 6. Create a [facility] password. You can change your password at any time. 7. Enter your Password Reset Question and Answer. This can be used at a later time if you forget your password. 8. Enter your e-mail address. You will receive e-mail notification when new information is available in [address] 9. Click Sign Up. You can now view and download portions of your medical record. 10. Click the Download Summary menu link to download a portable copy of your medical information. If you have questions, please visit the Frequently Asked Questions section of the [facility] website. Remember, [facility] is NOT to be used for urgent needs. For medical emergencies, dial 911. Now available from your iPhone and Android! Please provide this summary of care documentation to your next provider. Your primary care clinician is listed as Lovette Brothers If you have any questions after today's visit, please call [phone].

## 2017-03-10 NOTE — PATIENT INSTRUCTIONS
DASH Diet: Care Instructions  Your Care Instructions  The DASH diet is an eating plan that can help lower your blood pressure. DASH stands for Dietary Approaches to Stop Hypertension. Hypertension is high blood pressure. The DASH diet focuses on eating foods that are high in calcium, potassium, and magnesium. These nutrients can lower blood pressure. The foods that are highest in these nutrients are fruits, vegetables, low-fat dairy products, nuts, seeds, and legumes. But taking calcium, potassium, and magnesium supplements instead of eating foods that are high in those nutrients does not have the same effect. The DASH diet also includes whole grains, fish, and poultry. The DASH diet is one of several lifestyle changes your doctor may recommend to lower your high blood pressure. Your doctor may also want you to decrease the amount of sodium in your diet. Lowering sodium while following the DASH diet can lower blood pressure even further than just the DASH diet alone. Follow-up care is a key part of your treatment and safety. Be sure to make and go to all appointments, and call your doctor if you are having problems. It's also a good idea to know your test results and keep a list of the medicines you take. How can you care for yourself at home? Following the DASH diet  · Eat 4 to 5 servings of fruit each day. A serving is 1 medium-sized piece of fruit, ½ cup chopped or canned fruit, 1/4 cup dried fruit, or 4 ounces (½ cup) of fruit juice. Choose fruit more often than fruit juice. · Eat 4 to 5 servings of vegetables each day. A serving is 1 cup of lettuce or raw leafy vegetables, ½ cup of chopped or cooked vegetables, or 4 ounces (½ cup) of vegetable juice. Choose vegetables more often than vegetable juice. · Get 2 to 3 servings of low-fat and fat-free dairy each day. A serving is 8 ounces of milk, 1 cup of yogurt, or 1 ½ ounces of cheese. · Eat 6 to 8 servings of grains each day.  A serving is 1 slice of bread, 1 ounce of dry cereal, or ½ cup of cooked rice, pasta, or cooked cereal. Try to choose whole-grain products as much as possible. · Limit lean meat, poultry, and fish to 2 servings each day. A serving is 3 ounces, about the size of a deck of cards. · Eat 4 to 5 servings of nuts, seeds, and legumes (cooked dried beans, lentils, and split peas) each week. A serving is 1/3 cup of nuts, 2 tablespoons of seeds, or ½ cup of cooked beans or peas. · Limit fats and oils to 2 to 3 servings each day. A serving is 1 teaspoon of vegetable oil or 2 tablespoons of salad dressing. · Limit sweets and added sugars to 5 servings or less a week. A serving is 1 tablespoon jelly or jam, ½ cup sorbet, or 1 cup of lemonade. · Eat less than 2,300 milligrams (mg) of sodium a day. If you limit your sodium to 1,500 mg a day, you can lower your blood pressure even more. Tips for success  · Start small. Do not try to make dramatic changes to your diet all at once. You might feel that you are missing out on your favorite foods and then be more likely to not follow the plan. Make small changes, and stick with them. Once those changes become habit, add a few more changes. · Try some of the following:  ¨ Make it a goal to eat a fruit or vegetable at every meal and at snacks. This will make it easy to get the recommended amount of fruits and vegetables each day. ¨ Try yogurt topped with fruit and nuts for a snack or healthy dessert. ¨ Add lettuce, tomato, cucumber, and onion to sandwiches. ¨ Combine a ready-made pizza crust with low-fat mozzarella cheese and lots of vegetable toppings. Try using tomatoes, squash, spinach, broccoli, carrots, cauliflower, and onions. ¨ Have a variety of cut-up vegetables with a low-fat dip as an appetizer instead of chips and dip. ¨ Sprinkle sunflower seeds or chopped almonds over salads. Or try adding chopped walnuts or almonds to cooked vegetables. ¨ Try some vegetarian meals using beans and peas. Add garbanzo or kidney beans to salads. Make burritos and tacos with mashed mcnally beans or black beans. Where can you learn more? Go to http://santo-juani.info/. Enter H246 in the search box to learn more about \"DASH Diet: Care Instructions. \"  Current as of: March 23, 2016  Content Version: 11.1  © 2006-2016 [employer]. Care instructions adapted under license by [employer] (which disclaims liability or warranty for this information). If you have questions about a medical condition or this instruction, always ask your healthcare professional. [place] [address] any warranty or liability for your use of this information. Leg and Ankle Edema: Care Instructions  Your Care Instructions  Swelling in the legs, ankles, and feet is called edema. It is common after you sit or stand for a while. Long plane flights or car rides often cause swelling in the legs and feet. You may also have swelling if you have to stand for long periods of time at your job. Problems with the veins in the legs (varicose veins) and changes in hormones can also cause swelling. Sometimes the swelling in the ankles and feet is caused by a more serious problem, such as heart failure, infection, blood clots, or liver or kidney disease. Follow-up care is a key part of your treatment and safety. Be sure to make and go to all appointments, and call your doctor if you are having problems. Its also a good idea to know your test results and keep a list of the medicines you take. How can you care for yourself at home? · If your doctor gave you medicine, take it as prescribed. Call your doctor if you think you are having a problem with your medicine. · Whenever you are resting, raise your legs up. Try to keep the swollen area higher than the level of your heart. · Take breaks from standing or sitting in one position. ¨ Walk around to increase the blood flow in your lower legs.   ¨ Move your feet and ankles often while you stand, or tighten and relax your leg muscles. · Wear support stockings. Put them on in the morning, before swelling gets worse. · Eat a balanced diet. Lose weight if you need to. · Limit the amount of salt (sodium) in your diet. Salt holds fluid in the body and may increase swelling. When should you call for help? Call 911 anytime you think you may need emergency care. For example, call if:  · You have symptoms of a blood clot in your lung (called a pulmonary embolism). These may include:  ¨ Sudden chest pain. ¨ Trouble breathing. ¨ Coughing up blood. Call your doctor now or seek immediate medical care if:  · You have signs of a blood clot, such as:  ¨ Pain in your calf, back of the knee, thigh, or groin. ¨ Redness and swelling in your leg or groin. · You have symptoms of infection, such as:  ¨ Increased pain, swelling, warmth, or redness. ¨ Red streaks or pus. ¨ A fever. Watch closely for changes in your health, and be sure to contact your doctor if:  · Your swelling is getting worse. · You have new or worsening pain in your legs. · You do not get better as expected. Where can you learn more? Go to http://santo-juani.info/. Enter E292 in the search box to learn more about \"Leg and Ankle Edema: Care Instructions. \"  Current as of: May 27, 2016  Content Version: 11.1  © 4205-9342 everbill. Care instructions adapted under license by [employer] (which disclaims liability or warranty for this information). If you have questions about a medical condition or this instruction, always ask your healthcare professional. [place] [address] any warranty or liability for your use of this information. Try to weigh yourself every morning. That can also help gauge how much weight/fluid you have gained.

## 2017-03-20 ENCOUNTER — HOSPITAL ENCOUNTER (OUTPATIENT)
Dept: MAMMOGRAPHY | Age: 72
Discharge: HOME | End: 2017-03-20
Attending: INTERNAL MEDICINE
Payer: MEDICARE

## 2017-03-20 DIAGNOSIS — M81.0 POST-MENOPAUSAL OSTEOPOROSIS: ICD-10-CM

## 2017-03-20 PROCEDURE — 77080 DXA BONE DENSITY AXIAL: CPT

## 2017-03-22 NOTE — PROGRESS NOTES
Bone scan shows osteopenia (bit weaker than normal, but not yet to osteoporosis). Continue with vit d and calcium, but would also consider fosamax 35 mg once weekly. If she is in agreement, then please order for her.

## 2017-03-23 RX ORDER — ALENDRONATE SODIUM 70 MG/1
70 TABLET ORAL
Qty: 12 TAB | Refills: 3 | Status: ON HOLD | OUTPATIENT
Start: 2017-03-23 | End: 2018-12-20 | Stop reason: CLARIF

## 2017-03-23 NOTE — PROGRESS NOTES
Bone density test results reviewed with patient. Patient verbalized understanding and does not have any questions at this time. Patient wants to start Fosamax medication. RX request sent to Dr. Duran Mckeon.

## 2017-04-04 ENCOUNTER — OFFICE VISIT (OUTPATIENT)
Dept: INTERNAL MEDICINE CLINIC | Age: 72
End: 2017-04-04

## 2017-04-04 VITALS
WEIGHT: 163 LBS | RESPIRATION RATE: 16 BRPM | DIASTOLIC BLOOD PRESSURE: 63 MMHG | SYSTOLIC BLOOD PRESSURE: 101 MMHG | OXYGEN SATURATION: 97 % | HEIGHT: 61 IN | TEMPERATURE: 98 F | BODY MASS INDEX: 30.78 KG/M2 | HEART RATE: 60 BPM

## 2017-04-04 DIAGNOSIS — F41.9 ANXIETY AND DEPRESSION: Primary | ICD-10-CM

## 2017-04-04 DIAGNOSIS — E78.00 PURE HYPERCHOLESTEROLEMIA: Chronic | ICD-10-CM

## 2017-04-04 DIAGNOSIS — M85.80 OSTEOPENIA, UNSPECIFIED LOCATION: ICD-10-CM

## 2017-04-04 DIAGNOSIS — F32.A ANXIETY AND DEPRESSION: Primary | ICD-10-CM

## 2017-04-04 DIAGNOSIS — I10 ESSENTIAL HYPERTENSION: Chronic | ICD-10-CM

## 2017-04-04 DIAGNOSIS — I25.10 ATHEROSCLEROSIS OF NATIVE CORONARY ARTERY OF NATIVE HEART WITHOUT ANGINA PECTORIS: Chronic | ICD-10-CM

## 2017-04-04 PROBLEM — M81.0 OSTEOPOROSIS: Chronic | Status: ACTIVE | Noted: 2017-04-04

## 2017-04-04 RX ORDER — ASPIRIN 81 MG/1
81 TABLET ORAL DAILY
Qty: 90 TAB | Refills: 9
Start: 2017-04-04

## 2017-04-04 NOTE — PROGRESS NOTES
Dwaine Madrigal is a 67 y.o. female who presents for evaluation of routine follow up. Last seen by me march 10, started lexapro then, which has helped tremendously. Has meeting later today with her  to file for separation. ROS:  Constitutional: negative for fevers, chills, anorexia and weight loss  Eyes:   negative for visual disturbance and irritation  ENT:   negative for tinnitus,sore throat,nasal congestion,ear pain,hoarseness  Respiratory:  negative for cough, hemoptysis, dyspnea,wheezing  CV:   negative for chest pain, palpitations, lower extremity edema  GI:   negative for nausea, vomiting, diarrhea, abdominal pain,melena  Genitourinary: negative for frequency, dysuria and hematuria  Musculoskel: negative for myalgias, arthralgias, back pain, muscle weakness, joint pain  Neurological:  negative for headaches, dizziness, focal weakness, numbness  Psychiatric:     ++ for depression or anxiety--much improved. Past Medical History:   Diagnosis Date    Asthma     mild, uses inhaler prn only. No problems x > 1 year    Broken wrist 11/20/2013    right wrist    CAD (coronary artery disease) 2162,2843    stents. x2 Currently asymptomatic & exercises 5x/ week    Cancer ([facility] [address]     skin, mole removed    DDD (degenerative disc disease)     DJD (degenerative joint disease)     Environmental allergies     Hypercholesterolemia     Hypertension     Osteopenia     Recurrent UTI        Past Surgical History:   Procedure Laterality Date    COLONOSCOPY,DIAGNOSTIC  11/20/2015     tics; no polyps; Dr. Loni Lezama; no follow up for screening    ENDOSCOPY, COLON, DIAGNOSTIC  7/15//2005    neg; 10 year repeat; Dr. Cathy Hugo  2009    1 stent : 2009 R coronary    HX HEART CATHETERIZATION      LAD stendted 5/2012    HX HIP REPLACEMENT Left 01/17/2017    HX ORTHOPAEDIC  2/2011    right RCR     HX ORTHOPAEDIC  1/14/14    L4-5 DECOMPRESSION AND FUSION    HX ORTHOPAEDIC      r toe, bone removal    HX ORTHOPAEDIC Right 2/2015    THR    HX SHITAL AND BSO      HX TONSILLECTOMY      HX TYMPANOSTOMY  02/2013    in Right ear    HX WRIST FRACTURE TX Right 5/2013    ORIF       Family History   Problem Relation Age of Onset    Elevated Lipids Mother     Hypertension Father     Heart Disease Father     Stroke Father     Other Brother      AAA    Cancer Brother      brain       Social History     Social History    Marital status:      Spouse name: N/A    Number of children: N/A    Years of education: N/A     Occupational History    Not on file. Social History Main Topics    Smoking status: Never Smoker    Smokeless tobacco: Never Used    Alcohol use 0.0 oz/week      Comment: Social      Drug use: No    Sexual activity: Yes     Partners: Male     Birth control/ protection: None     Other Topics Concern    Not on file     Social History Narrative            Visit Vitals    /63 (BP 1 Location: Right arm, BP Patient Position: Sitting)    Pulse 60    Temp 98 °F (36.7 °C) (Oral)    Resp 16    Ht 5' 1\" (1.549 m)    Wt 163 lb (73.9 kg)    LMP  (LMP Unknown)    SpO2 97%    BMI 30.8 kg/m2       Physical Examination:   General - Well appearing female  HEENT - PERRL, TM no erythema/opacification, normal nasal turbinates, no oropharyngeal erythema or exudate, MMM  Neck - supple, no bruits, no thyroidomegaly, no lymphadenopathy  Pulm - clear to auscultation bilaterally  Cardio - RRR, normal S1 S2, no murmur  Abd - soft, nontender, no masses, no HSM  Extrem - no edema, +2 distal pulses  Neuro-  No focal deficits, CN intact     Assessment/Plan:    1. Anxiety and depression--much improved with lexapro  2. Cad with 2 stents--continue asa  3. Osteoporosis--on fosamax  4.  htn--controlled, continue cartia and hyzaar  5.  hyperlipids--on lipitor  6.   Leg edema--resolved, only needed lasix few times    rtc 6 months          Navid Alexis B Elen DARLING, DO

## 2017-04-04 NOTE — PROGRESS NOTES
Reviewed record in preparation for visit and have obtained necessary documentation. Identified pt with two pt identifiers(name and ). Chief Complaint   Patient presents with    Hypertension     follow up       Health Maintenance Due   Topic Date Due    GLAUCOMA SCREENING Q2Y  2016    MEDICARE YEARLY EXAM  2016       Ms. Wilfredo Bojorquez has a reminder for a \"due or due soon\" health maintenance. I have asked that she discuss health maintenance topic(s) due with Her  primary care provider. Coordination of Care Questionnaire:  :     1) Have you been to an emergency room, urgent care clinic since your last visit? no   Hospitalized since your last visit? no             2) Have you seen or consulted any other health care providers outside of [address] [place] [address] since your last visit? no  (Include any pap smears or colon screenings in this section.)      Patient is accompanied by self I have received verbal consent from [facility] to discuss any/all medical information while they are present in the room.

## 2017-04-04 NOTE — PATIENT INSTRUCTIONS
Recovering From Depression: Care Instructions  Your Care Instructions  Taking good care of yourself is important as you recover from depression. In time, your symptoms will fade as your treatment takes hold. Do not give up. Instead, focus your energy on getting better. Your mood will improve. It just takes some time. Focus on things that can help you feel better, such as being with friends and family, eating well, and getting enough rest. But take things slowly. Do not do too much too soon. You will begin to feel better gradually. Follow-up care is a key part of your treatment and safety. Be sure to make and go to all appointments, and call your doctor if you are having problems. It's also a good idea to know your test results and keep a list of the medicines you take. How can you care for yourself at home? Be realistic  · If you have a large task to do, break it up into smaller steps you can handle, and just do what you can. · You may want to put off important decisions until your depression has lifted. If you have plans that will have a major impact on your life, such as marriage, divorce, or a job change, try to wait a bit. Talk it over with friends and loved ones who can help you look at the overall picture first.  · Reaching out to people for help is important. Do not isolate yourself. Let your family and friends help you. Find someone you can trust and confide in, and talk to that person. · Be patient, and be kind to yourself. Remember that depression is not your fault and is not something you can overcome with willpower alone. Treatment is necessary for depression, just like for any other illness. Feeling better takes time, and your mood will improve little by little. Stay active  · Stay busy and get outside. Take a walk, or try some other light exercise. · Talk with your doctor about an exercise program. Exercise can help with mild depression. · Go to a movie or concert.  Take part in a Sikhism activity or other social gathering. Go to a [employer] game. · Ask a friend to have dinner with you. Take care of yourself  · Eat a balanced diet with plenty of fresh fruits and vegetables, whole grains, and lean protein. If you have lost your appetite, eat small snacks rather than large meals. · Avoid drinking alcohol or using illegal drugs. Do not take medicines that have not been prescribed for you. They may interfere with medicines you may be taking for depression, or they may make your depression worse. · Take your medicines exactly as they are prescribed. You may start to feel better within 1 to 3 weeks of taking antidepressant medicine. But it can take as many as 6 to 8 weeks to see more improvement. If you have questions or concerns about your medicines, or if you do not notice any improvement by 3 weeks, talk to your doctor. · If you have any side effects from your medicine, tell your doctor. Antidepressants can make you feel tired, dizzy, or nervous. Some people have dry mouth, constipation, headaches, sexual problems, or diarrhea. Many of these side effects are mild and will go away on their own after you have been taking the medicine for a few weeks. Some may last longer. Talk to your doctor if side effects are bothering you too much. You might be able to try a different medicine. · Get enough sleep. If you have problems sleeping:  ¨ Go to bed at the same time every night, and get up at the same time every morning. ¨ Keep your bedroom dark and quiet. ¨ Do not exercise after 5:00 p.m. ¨ Avoid drinks with caffeine after 5:00 p.m. · Avoid sleeping pills unless they are prescribed by the doctor treating your depression. Sleeping pills may make you groggy during the day, and they may interact with other medicine you are taking. · If you have any other illnesses, such as diabetes, heart disease, or high blood pressure, make sure to continue with your treatment.  Tell your doctor about all of the medicines you take, including those with or without a prescription. · Keep the numbers for these national suicide hotlines: 9-426-388-TALK (0-531.556.4071) and 8-007-SJSOWHQ (7-286.145.7472). If you or someone you know talks about suicide or feeling hopeless, get help right away. When should you call for help? Call 911 anytime you think you may need emergency care. For example, call if:  · You feel like hurting yourself or someone else. · Someone you know has depression and is about to attempt or is attempting suicide. Call your doctor now or seek immediate medical care if:  · You hear voices. · Someone you know has depression and:  ¨ Starts to give away his or her possessions. ¨ Uses illegal drugs or drinks alcohol heavily. ¨ Talks or writes about death, including writing suicide notes or talking about guns, knives, or pills. ¨ Starts to spend a lot of time alone. ¨ Acts very aggressively or suddenly appears calm. Watch closely for changes in your health, and be sure to contact your doctor if:  · You do not get better as expected. Where can you learn more? Go to http://santo-juani.info/. Enter E282 in the search box to learn more about \"Recovering From Depression: Care Instructions. \"  Current as of: July 26, 2016  Content Version: 11.2  © 9727-1307 [employer]. Care instructions adapted under license by [employer] (which disclaims liability or warranty for this information). If you have questions about a medical condition or this instruction, always ask your healthcare professional. [place] [address] any warranty or liability for your use of this information.

## 2017-07-18 ENCOUNTER — TELEPHONE (OUTPATIENT)
Dept: INTERNAL MEDICINE CLINIC | Age: 72
End: 2017-07-18

## 2017-07-18 NOTE — TELEPHONE ENCOUNTER
Call completed to patient, two identifiers verified. Patient advised that an appointment for evaluation needs to be scheduled. Patient offered an declined [address] [place] [address]. Patient offered an accepted an appointment for Wednesday, July 19, 2017 08:45 AM with Dr. Suzan Asif.

## 2017-07-18 NOTE — TELEPHONE ENCOUNTER
Patient states she needs a call back in reference to getting something called in for rash that is spreading. Advised patient would have nurse call but may be required to be seen by physician. Please call to advise.  Thank you

## 2017-07-19 ENCOUNTER — OFFICE VISIT (OUTPATIENT)
Dept: INTERNAL MEDICINE CLINIC | Age: 72
End: 2017-07-19

## 2017-07-19 VITALS
BODY MASS INDEX: 30.78 KG/M2 | SYSTOLIC BLOOD PRESSURE: 119 MMHG | WEIGHT: 163 LBS | TEMPERATURE: 97.5 F | DIASTOLIC BLOOD PRESSURE: 68 MMHG | HEART RATE: 59 BPM | HEIGHT: 61 IN | OXYGEN SATURATION: 96 %

## 2017-07-19 DIAGNOSIS — L30.9 DERMATITIS: Primary | ICD-10-CM

## 2017-07-19 RX ORDER — TRIAMCINOLONE ACETONIDE 1 MG/G
CREAM TOPICAL 2 TIMES DAILY
Qty: 30 G | Refills: 1 | Status: ON HOLD | OUTPATIENT
Start: 2017-07-19 | End: 2018-12-20 | Stop reason: CLARIF

## 2017-07-19 NOTE — PROGRESS NOTES
HISTORY OF PRESENT ILLNESS  Abdelrahman Mayfield is a 67 y.o. female. HPI   Pt here for acute care  Itchy rash on arms and legs x 4 days  Spread yesterday slightly around the ankles  Took benadryl and applied cortisone crm yesterday , helped some  No yardwork  Tool antibiotic eye drop 3 weeks ago for one week for eye infection  No new medicines  Was in [place] for 45 min last week a few days prior to the rash    Patient Active Problem List    Diagnosis Date Noted    Osteoporosis 04/04/2017    Constipation 01/27/2017    Anxiety and depression 01/03/2017    Prediabetes 09/27/2016    Tinea pedis of right foot 01/18/2016    Rib pain on left side 11/09/2015    Essential hypertension 07/08/2015    Atherosclerosis of native coronary artery without angina pectoris 07/08/2015    Memory disturbance 07/08/2015    Hip osteoarthritis 03/17/2015    Hx of decompressive lumbar laminectomy 02/10/2014    Spinal stenosis 01/16/2014    Left lumbar radiculitis 08/27/2013    Right wrist fracture 03/04/2013    Tinnitus of right ear 03/04/2013    Vitamin D deficiency 07/20/2011    S/P rotator cuff surgery 07/20/2011    Pure hypercholesterolemia 11/16/2009    Asthma 11/16/2009    Allergic rhinitis 11/16/2009     Current Outpatient Prescriptions   Medication Sig Dispense Refill    aspirin delayed-release 81 mg tablet Take 1 Tab by mouth daily. 90 Tab 9    alendronate (FOSAMAX) 70 mg tablet Take 1 Tab by mouth every seven (7) days. 12 Tab 3    meloxicam (MOBIC) 15 mg tablet       traMADol (ULTRAM) 50 mg tablet TK 1 TO 2 TS PO Q 4 TO 6 H PRN P  0    escitalopram oxalate (LEXAPRO) 10 mg tablet Take 1 Tab by mouth daily. 90 Tab 3    furosemide (LASIX) 40 mg tablet TAKE 1 TABLET BY MOUTH DAILY AS NEEDED FOR SWELLING 90 Tab 1    losartan-hydroCHLOROthiazide (HYZAAR) 100-25 mg per tablet TAKE 1 TABLET EVERY DAY 90 Tab 3    magnesium hydroxide (MILK OF MAGNESIA) 400 mg/5 mL suspension Take 30 mL by mouth daily.  Indications: Constipation 180 mL 1    senna-docusate (PERICOLACE) 8.6-50 mg per tablet Take 1 Tab by mouth daily. 30 Tab 0    doxazosin (CARDURA) 2 mg tablet Take 2 mg by mouth nightly.  CARTIA  mg ER capsule TAKE 1 CAPSULE EVERY DAY 90 Cap 3    PREMARIN 0.625 mg/gram vaginal cream Insert  into vagina every Monday, Wednesday, Friday.  atorvastatin (LIPITOR) 80 mg tablet TAKE 1 TABLET EVERY DAY 90 Tab 3    BENEFIBER, GUAR GUM, PO Take 2 Tabs by mouth daily.  gabapentin (NEURONTIN) 300 mg capsule Take 300 mg by mouth two (2) times a day. Indications: NEUROPATHIC PAIN      triamcinolone (NASACORT AQ) 55 mcg nasal inhaler 1 [place] by Both Nostrils route daily.  MULTIVITAMIN PO Take 1 Tab by mouth daily.  Cetirizine (ZYRTEC) 10 mg Cap Take 10 mg by mouth daily. Indications: ALLERGIC RHINITIS       Allergies   Allergen Reactions    Tape [Adhesive] Rash     Bandaids: redness    Ciprofloxacin Diarrhea     And dizziness    Pcn [Penicillins] Other (comments)     \"whelps\"    Prednisone Other (comments)     Terrible downer           ROS    Physical Exam   Constitutional: She appears well-developed and well-nourished. Appears stated age   Cardiovascular: Normal rate, regular rhythm and normal heart sounds. Exam reveals no gallop and no friction rub. No murmur heard. Pulmonary/Chest: Effort normal and breath sounds normal. No respiratory distress. She has no wheezes. Abdominal: Soft. Bowel sounds are normal.   Musculoskeletal: She exhibits no edema. Neurological: She is alert. Skin:        Psychiatric: She has a normal mood and affect. Nursing note and vitals reviewed. CARMINA and Juventinoroberto carlos Villanueva was seen today for skin problem and annual wellness visit.     Diagnoses and all orders for this visit:    Dermatitis   Red papular nonblanching rash primarily on lower legs in sun exposed areas only   limit sun exposure   Increase benadryl to bid   Kenalog crm bid   Unclear if related to one of her current medicines but will not stop any at this point   Has f/u PCP in 3 weeks  Other orders  -     triamcinolone acetonide (KENALOG) 0.1 % topical cream; Apply  to affected area two (2) times a day. use thin layer bid to lower legs x 1 week      Follow-up Disposition:  Return if symptoms worsen or fail to improve.

## 2017-07-19 NOTE — MR AVS SNAPSHOT
Visit Information Date & Time Provider Department Dept. Phone Encounter #  
 7/19/2017  8:45 AM Concetta Schwarz, [address] [phone] [id number] Follow-up Instructions Return if symptoms worsen or fail to improve. Your Appointments 8/8/2017  9:30 AM  
PHYSICAL PRE OP with Avila Denis III,  MANAV [facility] [address]) Appt Note: CPE  
 [address] [facility] [address] [place] [address]  
[address] [facility] [address] [place] [address] Upcoming Health Maintenance Date Due  
 GLAUCOMA SCREENING Q2Y 11/6/2016 MEDICARE YEARLY EXAM 11/9/2016 BREAST CANCER SCRN MAMMOGRAM 11/5/2017 INFLUENZA AGE 9 TO ADULT 8/1/2017 DTaP/Tdap/Td series (2 - Td) 8/4/2026 Allergies as of 7/19/2017  Review Complete On: 7/19/2017 By: Betsy Little, LPN Severity Noted Reaction Type Reactions Tape [Adhesive] Medium 04/30/2013   Topical Rash Bandaids: redness Ciprofloxacin  11/09/2015   Not Verified Diarrhea And dizziness Pcn [Penicillins]  11/06/2009    Other (comments) \"whelps\" Prednisone  11/21/2012    Other (comments) Terrible downer Current Immunizations  Reviewed on 8/31/2015 Name Date Hepatitis A Vaccine 6/1/2009 Hepatitis B Vaccine 5/29/2009 Influenza Vaccine 10/1/2016, 10/6/2014, 9/18/2013 Influenza Vaccine (Quad) PF 8/31/2015 Influenza Vaccine Split 10/1/2011 Pneumococcal Conjugate (PCV-13) 10/12/2016 Pneumococcal Vaccine (Unspecified Type) 9/7/2012 TD Vaccine 6/1/2009 Tdap 8/4/2016  5:21 PM  
 Zoster Vaccine, Live 12/7/2016 Not reviewed this visit You Were Diagnosed With   
  
 Codes Comments Dermatitis    -  Primary ICD-10-CM: L30.9 ICD-9-CM: 692.9 Vitals  BP Pulse Temp Height(growth percentile) Weight(growth percentile) LMP  
 119/68 (BP 1 Location: Left arm, BP Patient Position: Sitting) (!) 59 97.5 °F (36.4 °C) (Oral) 5' 1\" (1.549 m) 163 lb (73.9 kg) (LMP Unknown) SpO2 BMI OB Status Smoking Status 96% 30.8 kg/m2 Hysterectomy Never Smoker BMI and BSA Data Body Mass Index Body Surface Area  
 30.8 kg/m 2 1.78 m 2 Preferred Pharmacy Pharmacy Name Phone [facility], [address] [place] [address] [phone] Your Updated Medication List  
  
   
This list is accurate as of: 7/19/17  9:19 AM.  Always use your most recent med list.  
  
  
  
  
 alendronate 70 mg tablet Commonly known as:  FOSAMAX Take 1 Tab by mouth every seven (7) days. aspirin delayed-release 81 mg tablet Take 1 Tab by mouth daily. atorvastatin 80 mg tablet Commonly known as:  LIPITOR  
TAKE 1 TABLET EVERY DAY  
  
 BENEFIBER (GUAR GUM) PO Take 2 Tabs by mouth daily. CARTIA  mg ER capsule Generic drug:  dilTIAZem CD  
TAKE 1 CAPSULE EVERY DAY  
  
 doxazosin 2 mg tablet Commonly known as:  CARDURA Take 2 mg by mouth nightly. escitalopram oxalate 10 mg tablet Commonly known as:  Celesta Prost Take 1 Tab by mouth daily. furosemide 40 mg tablet Commonly known as:  LASIX TAKE 1 TABLET BY MOUTH DAILY AS NEEDED FOR SWELLING  
  
 gabapentin 300 mg capsule Commonly known as:  NEURONTIN Take 300 mg by mouth two (2) times a day. Indications: NEUROPATHIC PAIN  
  
 losartan-hydroCHLOROthiazide 100-25 mg per tablet Commonly known as:  HYZAAR  
TAKE 1 TABLET EVERY DAY  
  
 magnesium hydroxide 400 mg/5 mL suspension Commonly known as:  MILK OF MAGNESIA Take 30 mL by mouth daily. Indications: Constipation  
  
 meloxicam 15 mg tablet Commonly known as:  MOBIC  
  
 MULTIVITAMIN PO Take 1 Tab by mouth daily. * NASACORT AQ 55 mcg nasal inhaler Generic drug:  triamcinolone 1 [place] by Both Nostrils route daily.   
  
 * triamcinolone acetonide 0.1 % topical cream  
 Commonly known as:  KENALOG Apply  to affected area two (2) times a day. use thin layer bid to lower legs x 1 week PREMARIN 0.625 mg/gram vaginal cream  
Generic drug:  conjugated estrogens Insert  into vagina every Monday, Wednesday, Friday. senna-docusate 8.6-50 mg per tablet Commonly known as:  Sg Ra Take 1 Tab by mouth daily. ZyrTEC 10 mg Cap Generic drug:  Cetirizine Take 10 mg by mouth daily. Indications: ALLERGIC RHINITIS * Notice: This list has 2 medication(s) that are the same as other medications prescribed for you. Read the directions carefully, and ask your doctor or other care provider to review them with you. Prescriptions Sent to Pharmacy Refills  
 triamcinolone acetonide (KENALOG) 0.1 % topical cream 1 Sig: Apply  to affected area two (2) times a day. use thin layer bid to lower legs x 1 week Class: Normal  
 Pharmacy: [facility] [address] #: [phone] Route: Topical  
  
Follow-up Instructions Return if symptoms worsen or fail to improve. Patient Instructions Medicare Part B Preventive Services Limitations Recommendation/Date completed if known Scheduled/ Next Due Bone Mass Measurement 
(age 72 & older, biennial) Requires diagnosis related to osteoporosis or estrogen deficiency. Biennial benefit unless patient has history of long-term glucocorticoid tx or baseline is needed because initial test was by other method Completed March 2017 Recommended every 2 years Due  
Cardiovascular Screening Blood Tests (every 5 years) Total cholesterol, HDL, Triglycerides Order as a panel if possible Completed Recommended annually Due Colorectal Cancer Screening 
-Fecal occult blood test (annual) -Flexible sigmoidoscopy (5y) 
-Screening colonoscopy (10y) -Barium Enema  Completed 2015 Dr. Nedra Teague Recommended every 10 years No further screenigns Counseling to Prevent Tobacco Use (up to 8 sessions per year) - Counseling greater than 3 and up to 10 minutes - Counseling greater than 10 minutes Patients must be asymptomatic of tobacco-related conditions to receive as preventive service Diabetes Screening Tests (at least every 3 years, Medicare covers annually or at 6-month intervals for prediabetic patients) Fasting blood sugar (FBS) or glucose tolerance test (GTT) Patient must be diagnosed with one of the following: 
-Hypertension, Dyslipidemia, obesity, previous impaired FBS or GTT 
Or any two of the following: overweight, FH of diabetes, age ? 72, history of gestational diabetes, birth of baby weighing more than 9 pounds Completed Recommended annually Due Diabetes Self-Management Training (DSMT) (no USPSTF recommendation) Requires referral by treating physician for patient with diabetes or renal disease. 10 hours of initial DSMT session of no less than 30 minutes each in a continuous 12-month period. 2 hours of follow-up DSMT in subsequent years. Glaucoma Screening (no USPSTF recommendation) Diabetes mellitus, family history, , age 48 or over,  American, age 72 or over Completed Recommended annually Due Human Immunodeficiency Virus (HIV) Screening (annually for increased risk patients) HIV-1 and HIV-2 by EIA, GAVINO, rapid antibody test, or oral mucosa transudate Patient must be at increased risk for HIV infection per USPSTF guidelines or pregnant. Tests covered annually for patients at increased risk. Pregnant patients may receive up to 3 test during pregnancy. Medical Nutrition Therapy (MNT) (for diabetes or renal disease not recommended schedule) Requires referral by treating physician for patient with diabetes or renal disease.   Can be provided in same year as diabetes self-management training (DSMT), and CMS recommends medical nutrition therapy take place after DSMT. Up to 3 hours for initial year and 2 hours in subsequent years. Prostate Cancer Screening (annually up to age 76) - Digital rectal exam (BEAN) - Prostate specific antigen (PSA) Annually (age 48 or over), BEAN not paid separately when covered E/M service is provided on same date Completed Recommended annually Due Seasonal Influenza Vaccination (annually)  Completed Recommended annually Due Fall 2017l Pneumococcal Vaccination (once after 65)   Complete Shingles Vaccine is also recommended once after age 61  Completed Hepatitis B Vaccinations (if medium/high risk) Medium/high risk factors:  End-stage renal disease, Hemophiliacs who received Factor VIII or IX concentrates, Clients of institutions for the mentally retarded, Persons who live in the same house as a HepB virus carrier, Homosexual men, Illicit injectable drug abusers. Screening Mammography (biennial age 54-69)? Annually (age 36 or over) Completed Recommended annually Due  
Screening Pap Tests and Pelvic Examination (up to age 79 and after 79 if unknown history or abnormal study last 10 years) Every 24 months except high risk Completed Recommended every 2 years Due Ultrasound Screening for Abdominal Aortic Aneurysm (AAA) (once) Patient must be referred through IPPE and not have had a screening for abdominal aortic aneurysm before under Medicare. Limited to patients who meet one of the following criteria: 
- Men who are 73-68 years old and have smoked more than 100 cigarettes in their lifetime. 
-Anyone with a FH of AAA 
-Anyone recommended for screening by USPSTF Not covered by Medicare as preventive. Introducing [facility]! Javan Kenny introduces [employer] patient portal. Now you can access parts of your medical record, email your doctor's office, and request medication refills online. 1. In your internet browser, go to [email or web address] 2. Click on the First Time User? Click Here link in the Sign In box. You will see the New Member Sign Up page. 3. Enter your Desk Access Code exactly as it appears below. You will not need to use this code after youve completed the sign-up process. If you do not sign up before the expiration date, you must request a new code. · Desk Access Code: [email or web address] Expires: 10/17/2017  9:16 AM 
 
4. Enter the last four digits of your Social Security Number (xxxx) and Date of Birth (mm/dd/yyyy) as indicated and click Submit. You will be taken to the next sign-up page. 5. Create a Makarat ID. This will be your Desk login ID and cannot be changed, so think of one that is secure and easy to remember. 6. Create a Desk password. You can change your password at any time. 7. Enter your Password Reset Question and Answer. This can be used at a later time if you forget your password. 8. Enter your e-mail address. You will receive e-mail notification when new information is available in [address] 9. Click Sign Up. You can now view and download portions of your medical record. 10. Click the Download Summary menu link to download a portable copy of your medical information. If you have questions, please visit the Frequently Asked Questions section of the Desk website. Remember, Desk is NOT to be used for urgent needs. For medical emergencies, dial 911. Now available from your iPhone and Android! Please provide this summary of care documentation to your next provider. Your primary care clinician is listed as Hilario Centeno If you have any questions after today's visit, please call [phone].

## 2017-08-08 ENCOUNTER — OFFICE VISIT (OUTPATIENT)
Dept: INTERNAL MEDICINE CLINIC | Age: 72
End: 2017-08-08

## 2017-08-08 VITALS
BODY MASS INDEX: 30.4 KG/M2 | HEART RATE: 57 BPM | HEIGHT: 61 IN | TEMPERATURE: 97.7 F | WEIGHT: 161 LBS | RESPIRATION RATE: 16 BRPM | DIASTOLIC BLOOD PRESSURE: 75 MMHG | SYSTOLIC BLOOD PRESSURE: 133 MMHG | OXYGEN SATURATION: 98 %

## 2017-08-08 DIAGNOSIS — M79.604 PAIN IN BOTH LOWER EXTREMITIES: ICD-10-CM

## 2017-08-08 DIAGNOSIS — F41.9 ANXIETY AND DEPRESSION: ICD-10-CM

## 2017-08-08 DIAGNOSIS — R07.81 PLEURITIC CHEST PAIN: Primary | ICD-10-CM

## 2017-08-08 DIAGNOSIS — I10 ESSENTIAL HYPERTENSION: Chronic | ICD-10-CM

## 2017-08-08 DIAGNOSIS — M81.0 AGE-RELATED OSTEOPOROSIS WITHOUT CURRENT PATHOLOGICAL FRACTURE: Chronic | ICD-10-CM

## 2017-08-08 DIAGNOSIS — I73.9 PAD (PERIPHERAL ARTERY DISEASE) (HCC): ICD-10-CM

## 2017-08-08 DIAGNOSIS — I25.10 ATHEROSCLEROSIS OF NATIVE CORONARY ARTERY OF NATIVE HEART WITHOUT ANGINA PECTORIS: Chronic | ICD-10-CM

## 2017-08-08 DIAGNOSIS — F32.A ANXIETY AND DEPRESSION: ICD-10-CM

## 2017-08-08 DIAGNOSIS — M79.605 PAIN IN BOTH LOWER EXTREMITIES: ICD-10-CM

## 2017-08-08 DIAGNOSIS — R07.81 RIB PAIN ON RIGHT SIDE: ICD-10-CM

## 2017-08-08 DIAGNOSIS — R73.9 HYPERGLYCEMIA: Chronic | ICD-10-CM

## 2017-08-08 DIAGNOSIS — Z00.00 ROUTINE ADULT HEALTH MAINTENANCE: Primary | ICD-10-CM

## 2017-08-08 RX ORDER — NEOMYCIN SULFATE, POLYMYXIN B SULFATE, AND DEXAMETHASONE 3.5; 10000; 1 MG/G; [USP'U]/G; MG/G
OINTMENT OPHTHALMIC
Refills: 4 | Status: ON HOLD | COMMUNITY
Start: 2017-06-28 | End: 2018-12-20 | Stop reason: CLARIF

## 2017-08-08 RX ORDER — NEOMYCIN SULFATE, POLYMYXIN B SULFATE AND DEXAMETHASONE 3.5; 10000; 1 MG/ML; [USP'U]/ML; MG/ML
SUSPENSION/ DROPS OPHTHALMIC
Refills: 3 | Status: ON HOLD | COMMUNITY
Start: 2017-06-28 | End: 2018-12-20 | Stop reason: CLARIF

## 2017-08-08 NOTE — PATIENT INSTRUCTIONS

## 2017-08-08 NOTE — PROGRESS NOTES
Hamilton Greco is a 67 y.o. female who presents for evaluation of cpe. Last seen by me April 4, 2017. Saw dr Vee Angeles here July 19 with rash, that resolved with kenalog cream.  Lots of minor complaints: Both legs hurt (worse with activity), has spot on right ribs that hurts, working on getting . ROS:  Constitutional: negative for fevers, chills, anorexia and weight loss  Eyes:   negative for visual disturbance and irritation  ENT:   negative for tinnitus,sore throat,nasal congestion,ear pain,hoarseness  Respiratory:  negative for cough, hemoptysis, dyspnea,wheezing  CV:   negative for chest pain, palpitations, lower extremity edema  GI:   negative for nausea, vomiting, diarrhea, abdominal pain,melena  Genitourinary: negative for frequency, dysuria and hematuria  Musculoskel: negative for myalgias, arthralgias, back pain, muscle weakness, joint pain  Neurological:  negative for headaches, dizziness, focal weakness, numbness  Psychiatric:     Negative for depression or anxiety      Past Medical History:   Diagnosis Date    Asthma     mild, uses inhaler prn only. No problems x > 1 year    Broken wrist 11/20/2013    right wrist    CAD (coronary artery disease) 6229,7322    stents. x2 Currently asymptomatic & exercises 5x/ week    Cancer ([facility] [address]     skin, mole removed    DDD (degenerative disc disease)     DJD (degenerative joint disease)     Environmental allergies     Hypercholesterolemia     Hypertension     Osteopenia     Recurrent UTI        Past Surgical History:   Procedure Laterality Date    COLONOSCOPY,DIAGNOSTIC  11/20/2015     tics; no polyps; Dr. Gal Dela Cruz; no follow up for screening    ENDOSCOPY, COLON, DIAGNOSTIC  7/15//2005    neg; 10 year repeat; Dr. Kevin Abla  2009    1 stent : 2009 R coronary    HX HEART CATHETERIZATION      LAD stendted 5/2012    HX HIP REPLACEMENT Left 01/17/2017    HX ORTHOPAEDIC  2/2011 right RCR     HX ORTHOPAEDIC  1/14/14    L4-5 DECOMPRESSION AND FUSION    HX ORTHOPAEDIC      r toe, bone removal    HX ORTHOPAEDIC Right 2/2015    THR    HX SHITAL AND BSO      HX TONSILLECTOMY      HX TYMPANOSTOMY  02/2013    in Right ear    HX WRIST FRACTURE TX Right 5/2013    ORIF       Family History   Problem Relation Age of Onset    Elevated Lipids Mother     Hypertension Father     Heart Disease Father     Stroke Father     Other Brother      AAA    Cancer Brother      brain       Social History     Social History    Marital status:      Spouse name: N/A    Number of children: N/A    Years of education: N/A     Occupational History    Not on file. Social History Main Topics    Smoking status: Never Smoker    Smokeless tobacco: Never Used    Alcohol use 0.0 oz/week      Comment: Social      Drug use: Yes     Special: Prescription, OTC    Sexual activity: Not Currently     Partners: Male     Birth control/ protection: None     Other Topics Concern    Not on file     Social History Narrative            Visit Vitals    /75 (BP 1 Location: Left arm, BP Patient Position: Sitting)    Pulse (!) 57    Temp 97.7 °F (36.5 °C) (Oral)    Resp 16    Ht 5' 1\" (1.549 m)    Wt 161 lb (73 kg)    LMP  (LMP Unknown)    SpO2 98%    BMI 30.42 kg/m2       Physical Examination:   General - Well appearing female  HEENT - PERRL, TM no erythema/opacification, normal nasal turbinates, no oropharyngeal erythema or exudate, MMM  Neck - supple, no bruits, no thyroidomegaly, no lymphadenopathy  Pulm - clear to auscultation bilaterally  Cardio - RRR, normal S1 S2, no murmur  Abd - soft, nontender, no masses, no HSM  Extrem - no edema, +2 distal pulses  Neuro-  No focal deficits, CN intact     Assessment/Plan:    1. Routine adult health maintenance--check cbc, cmp, flp, tsh, vit d, a1c  2. Bilateral leg pains--check santosh, though has good pulses both feet  3. Cad with stents--on asa  4. Prediabetes--check a1c, was 5.7  5. Anxiety and depression--continue lexapro  6.  htn--controlled with hyzaar and cartia  7.  hyperlipids--check flp, on lipitor  8.   Right rib pain--check xray, exam normal    Had eye exam done last month with dr Wing Vora, get results    rtc 6 months        Roel Cherry III, DO

## 2017-08-08 NOTE — MR AVS SNAPSHOT
Visit Information Date & Time Provider Department Dept. Phone Encounter #  
 8/8/2017  9:30 AM Gabbi Moura, [address] [place] [address],[address] [phone] [id number] Follow-up Instructions Return in about 6 months (around 2/8/2018). Upcoming Health Maintenance Date Due  
 GLAUCOMA SCREENING Q2Y 11/6/2016 MEDICARE YEARLY EXAM 11/9/2016 BREAST CANCER SCRN MAMMOGRAM 11/5/2017 DTaP/Tdap/Td series (2 - Td) 8/4/2026 Allergies as of 8/8/2017  Review Complete On: 8/8/2017 By: Neeta Brown III, DO Severity Noted Reaction Type Reactions Tape [Adhesive] Medium 04/30/2013   Topical Rash Bandaids: redness Ciprofloxacin  11/09/2015   Not Verified Diarrhea And dizziness Pcn [Penicillins]  11/06/2009    Other (comments) \"whelps\" Prednisone  11/21/2012    Other (comments) Terrible downer Current Immunizations  Reviewed on 8/31/2015 Name Date Hepatitis A Vaccine 6/1/2009 Hepatitis B Vaccine 5/29/2009 Influenza Vaccine 10/1/2016, 10/6/2014, 9/18/2013 Influenza Vaccine (Quad) PF 8/31/2015 Influenza Vaccine Split 10/1/2011 Pneumococcal Conjugate (PCV-13) 10/12/2016 TD Vaccine 6/1/2009 Tdap 8/4/2016  5:21 PM  
 ZZZ-RETIRED (DO NOT USE) Pneumococcal Vaccine (Unspecified Type) 9/7/2012 Zoster Vaccine, Live 12/7/2016 Not reviewed this visit You Were Diagnosed With   
  
 Codes Comments Routine adult health maintenance    -  Primary ICD-10-CM: Z00.00 ICD-9-CM: V70.0 Atherosclerosis of native coronary artery of native heart without angina pectoris     ICD-10-CM: I25.10 ICD-9-CM: 414.01 Essential hypertension     ICD-10-CM: I10 
ICD-9-CM: 401.9 Anxiety and depression     ICD-10-CM: F41.9, F32.9 ICD-9-CM: 300.00, 311 Age-related osteoporosis without current pathological fracture     ICD-10-CM: M81.0 ICD-9-CM: 733.01 Hyperglycemia     ICD-10-CM: R73.9 ICD-9-CM: 790.29 Pain in both lower extremities     ICD-10-CM: M79.604, M79.605 ICD-9-CM: 729.5 PAD (peripheral artery disease) (HCC)     ICD-10-CM: I73.9 ICD-9-CM: 443. 9 Vitals BP Pulse Temp Resp Height(growth percentile) Weight(growth percentile) 133/75 (BP 1 Location: Left arm, BP Patient Position: Sitting) (!) 57 97.7 °F (36.5 °C) (Oral) 16 5' 1\" (1.549 m) 161 lb (73 kg) LMP SpO2 BMI OB Status Smoking Status (LMP Unknown) 98% 30.42 kg/m2 Hysterectomy Never Smoker Vitals History BMI and BSA Data Body Mass Index Body Surface Area  
 30.42 kg/m 2 1.77 m 2 Preferred Pharmacy Pharmacy Name Phone [facility], [address] [place] [address] [phone] Your Updated Medication List  
  
   
This list is accurate as of: 8/8/17 10:43 AM.  Always use your most recent med list.  
  
  
  
  
 alendronate 70 mg tablet Commonly known as:  FOSAMAX Take 1 Tab by mouth every seven (7) days. aspirin delayed-release 81 mg tablet Take 1 Tab by mouth daily. atorvastatin 80 mg tablet Commonly known as:  LIPITOR  
TAKE 1 TABLET EVERY DAY  
  
 BENEFIBER (GUAR GUM) PO Take 2 Tabs by mouth daily. CARTIA  mg ER capsule Generic drug:  dilTIAZem CD  
TAKE 1 CAPSULE EVERY DAY  
  
 doxazosin 2 mg tablet Commonly known as:  CARDURA Take 2 mg by mouth nightly. escitalopram oxalate 10 mg tablet Commonly known as:  Celesta Prost Take 1 Tab by mouth daily. furosemide 40 mg tablet Commonly known as:  LASIX TAKE 1 TABLET BY MOUTH DAILY AS NEEDED FOR SWELLING  
  
 gabapentin 300 mg capsule Commonly known as:  NEURONTIN Take 300 mg by mouth two (2) times a day. Indications: NEUROPATHIC PAIN  
  
 losartan-hydroCHLOROthiazide 100-25 mg per tablet Commonly known as:  HYZAAR  
TAKE 1 TABLET EVERY DAY  
  
 meloxicam 15 mg tablet Commonly known as:  MOBIC  
  
 MULTIVITAMIN PO  
 Take 1 Tab by mouth daily. * NASACORT AQ 55 mcg nasal inhaler Generic drug:  triamcinolone 1 [place] by Both Nostrils route daily. * triamcinolone acetonide 0.1 % topical cream  
Commonly known as:  KENALOG Apply  to affected area two (2) times a day. use thin layer bid to lower legs x 1 week * neomycin-polymyxin-dexamethasone 3.5 mg/g-10,000 unit/g-0.1 % ophthalmic ointment Commonly known as:  DEXACINE  
APPLY IN BOTH EYES AT BEDTIME  
  
 * neomycin-polymyxin-dexamethasone 3.5mg/mL-10,000 unit/mL-0.1 % ophthalmic suspension Commonly known as:  DEXACIDIN, MAXITROL  
PUT 1 DROP IN OU TID PREMARIN 0.625 mg/gram vaginal cream  
Generic drug:  conjugated estrogens Insert  into vagina every Monday, Wednesday, Friday. senna-docusate 8.6-50 mg per tablet Commonly known as:  Johnnye Smack Take 1 Tab by mouth daily. ZyrTEC 10 mg Cap Generic drug:  Cetirizine Take 10 mg by mouth daily. Indications: ALLERGIC RHINITIS * Notice: This list has 4 medication(s) that are the same as other medications prescribed for you. Read the directions carefully, and ask your doctor or other care provider to review them with you. We Performed the Following CBC WITH AUTOMATED DIFF [49456 CPT(R)] HEMOGLOBIN A1C WITH EAG [53485 CPT(R)] LIPID PANEL [75630 CPT(R)] METABOLIC PANEL, COMPREHENSIVE [37985 CPT(R)] TSH 3RD GENERATION [93774 CPT(R)] VITAMIN D, 25 HYDROXY [id number] CPT(R)] Follow-up Instructions Return in about 6 months (around 2/8/2018). To-Do List   
 08/08/2017 Imaging:  ANKLE BRACHIAL INDEX Patient Instructions DASH Diet: Care Instructions Your Care Instructions The DASH diet is an eating plan that can help lower your blood pressure. DASH stands for Dietary Approaches to Stop Hypertension. Hypertension is high blood pressure.  
The DASH diet focuses on eating foods that are high in calcium, potassium, and magnesium. These nutrients can lower blood pressure. The foods that are highest in these nutrients are fruits, vegetables, low-fat dairy products, nuts, seeds, and legumes. But taking calcium, potassium, and magnesium supplements instead of eating foods that are high in those nutrients does not have the same effect. The DASH diet also includes whole grains, fish, and poultry. The DASH diet is one of several lifestyle changes your doctor may recommend to lower your high blood pressure. Your doctor may also want you to decrease the amount of sodium in your diet. Lowering sodium while following the DASH diet can lower blood pressure even further than just the DASH diet alone. Follow-up care is a key part of your treatment and safety. Be sure to make and go to all appointments, and call your doctor if you are having problems. It's also a good idea to know your test results and keep a list of the medicines you take. How can you care for yourself at home? Following the DASH diet · Eat 4 to 5 servings of fruit each day. A serving is 1 medium-sized piece of fruit, ½ cup chopped or canned fruit, 1/4 cup dried fruit, or 4 ounces (½ cup) of fruit juice. Choose fruit more often than fruit juice. · Eat 4 to 5 servings of vegetables each day. A serving is 1 cup of lettuce or raw leafy vegetables, ½ cup of chopped or cooked vegetables, or 4 ounces (½ cup) of vegetable juice. Choose vegetables more often than vegetable juice. · Get 2 to 3 servings of low-fat and fat-free dairy each day. A serving is 8 ounces of milk, 1 cup of yogurt, or 1 ½ ounces of cheese. · Eat 6 to 8 servings of grains each day. A serving is 1 slice of bread, 1 ounce of dry cereal, or ½ cup of cooked rice, pasta, or cooked cereal. Try to choose whole-grain products as much as possible. · Limit lean meat, poultry, and fish to 2 servings each day. A serving is 3 ounces, about the size of a deck of cards. · Eat 4 to 5 servings of nuts, seeds, and legumes (cooked dried beans, lentils, and split peas) each week. A serving is 1/3 cup of nuts, 2 tablespoons of seeds, or ½ cup of cooked beans or peas. · Limit fats and oils to 2 to 3 servings each day. A serving is 1 teaspoon of vegetable oil or 2 tablespoons of salad dressing. · Limit sweets and added sugars to 5 servings or less a week. A serving is 1 tablespoon jelly or jam, ½ cup sorbet, or 1 cup of lemonade. · Eat less than 2,300 milligrams (mg) of sodium a day. If you limit your sodium to 1,500 mg a day, you can lower your blood pressure even more. Tips for success · Start small. Do not try to make dramatic changes to your diet all at once. You might feel that you are missing out on your favorite foods and then be more likely to not follow the plan. Make small changes, and stick with them. Once those changes become habit, add a few more changes. · Try some of the following: ¨ Make it a goal to eat a fruit or vegetable at every meal and at snacks. This will make it easy to get the recommended amount of fruits and vegetables each day. ¨ Try yogurt topped with fruit and nuts for a snack or healthy dessert. ¨ Add lettuce, tomato, cucumber, and onion to sandwiches. ¨ Combine a ready-made pizza crust with low-fat mozzarella cheese and lots of vegetable toppings. Try using tomatoes, squash, spinach, broccoli, carrots, cauliflower, and onions. ¨ Have a variety of cut-up vegetables with a low-fat dip as an appetizer instead of chips and dip. ¨ Sprinkle sunflower seeds or chopped almonds over salads. Or try adding chopped walnuts or almonds to cooked vegetables. ¨ Try some vegetarian meals using beans and peas. Add garbanzo or kidney beans to salads. Make burritos and tacos with mashed mcnally beans or black beans. Where can you learn more? Go to http://santo-juani.info/.  
Enter E676 in the search box to learn more about \"DASH Diet: Care Instructions. \" Current as of: April 3, 2017 Content Version: 11.3 © 4719-5933 [employer]. Care instructions adapted under license by [employer] (which disclaims liability or warranty for this information). If you have questions about a medical condition or this instruction, always ask your healthcare professional. [address] any warranty or liability for your use of this information. Introducing [facility]! Bobby Keane introduces happyview patient portal. Now you can access parts of your medical record, email your doctor's office, and request medication refills online. 1. In your internet browser, go to [email or web address] 2. Click on the First Time User? Click Here link in the Sign In box. You will see the New Member Sign Up page. 3. Enter your happyview Access Code exactly as it appears below. You will not need to use this code after youve completed the sign-up process. If you do not sign up before the expiration date, you must request a new code. · happyview Access Code: [email or web address] Expires: 10/17/2017  9:16 AM 
 
4. Enter the last four digits of your Social Security Number (xxxx) and Date of Birth (mm/dd/yyyy) as indicated and click Submit. You will be taken to the next sign-up page. 5. Create a happyview ID. This will be your happyview login ID and cannot be changed, so think of one that is secure and easy to remember. 6. Create a happyview password. You can change your password at any time. 7. Enter your Password Reset Question and Answer. This can be used at a later time if you forget your password. 8. Enter your e-mail address. You will receive e-mail notification when new information is available in [address] 9. Click Sign Up. You can now view and download portions of your medical record. 10. Click the Download Summary menu link to download a portable copy of your medical information. If you have questions, please visit the Frequently Asked Questions section of the [employer] website. Remember, hint is NOT to be used for urgent needs. For medical emergencies, dial 911. Now available from your iPhone and Android! Please provide this summary of care documentation to your next provider. Your primary care clinician is listed as Yazmin Garcia If you have any questions after today's visit, please call [phone].

## 2017-08-08 NOTE — PROGRESS NOTES
Reviewed record in preparation for visit and have obtained necessary documentation. Identified pt with two pt identifiers(name and ). Chief Complaint   Patient presents with    Annual Exam       Health Maintenance Due   Topic Date Due    GLAUCOMA SCREENING Q2Y  2016    MEDICARE YEARLY EXAM  2016    INFLUENZA AGE 9 TO ADULT  2017    BREAST CANCER SCRN MAMMOGRAM  2017       Ms. Mohsen Zelaya has a reminder for a \"due or due soon\" health maintenance. I have asked that she discuss health maintenance topic(s) due with Her  primary care provider. Coordination of Care Questionnaire:  :     1) Have you been to an emergency room, urgent care clinic since your last visit? no   Hospitalized since your last visit? no             2) Have you seen or consulted any other health care providers outside of [address] [place] [address] since your last visit? Yes   (Include any pap smears or colon screenings in this section.)    3) Do you have an Advance Directive on file? Yes     4) Are you interested in receiving information on Advance Directives? NO    Patient is accompanied by self I have received verbal consent from Janet Dior to discuss any/all medical information while they are present in the room.

## 2017-08-08 NOTE — LETTER
8/9/2017 10:28 AM 
 
Ms. Nadir Ribeiro [address] [facility] [address] [place] [id number] Dear Nadir Ribeiro: Please find your most recent results below. Resulted Orders CBC WITH AUTOMATED DIFF Result Value Ref Range WBC 5.1 3.4 - 10.8 x10E3/uL  
 RBC 4.54 3.77 - 5.28 x10E6/uL HGB 14.5 11.1 - 15.9 g/dL HCT 41.9 34.0 - 46.6 % MCV 92 79 - 97 fL  
 MCH 31.9 26.6 - 33.0 pg  
 MCHC 34.6 31.5 - 35.7 g/dL  
 RDW 13.3 12.3 - 15.4 % PLATELET 418 547 - 872 x10E3/uL NEUTROPHILS 51 % Lymphocytes 38 % MONOCYTES 8 % EOSINOPHILS 3 % BASOPHILS 0 %  
 ABS. NEUTROPHILS 2.6 1.4 - 7.0 x10E3/uL Abs Lymphocytes 2.0 0.7 - 3.1 x10E3/uL  
 ABS. MONOCYTES 0.4 0.1 - 0.9 x10E3/uL  
 ABS. EOSINOPHILS 0.1 0.0 - 0.4 x10E3/uL  
 ABS. BASOPHILS 0.0 0.0 - 0.2 x10E3/uL IMMATURE GRANULOCYTES 0 %  
 ABS. IMM. GRANS. 0.0 0.0 - 0.1 x10E3/uL Narrative Performed at:  [address]  [id number] : Omaira Cat MD, Phone:  [phone] METABOLIC PANEL, COMPREHENSIVE Result Value Ref Range Glucose 111 (H) 65 - 99 mg/dL BUN 21 8 - 27 mg/dL Creatinine 0.90 0.57 - 1.00 mg/dL GFR est non-AA 64 >59 mL/min/1.73 GFR est AA 73 >59 mL/min/1.73  
 BUN/Creatinine ratio 23 12 - 28 Sodium 144 134 - 144 mmol/L Potassium 3.9 3.5 - 5.2 mmol/L Chloride 100 96 - 106 mmol/L  
 CO2 25 18 - 29 mmol/L Calcium 9.5 8.7 - 10.3 mg/dL Protein, total 6.8 6.0 - 8.5 g/dL Albumin 4.6 3.5 - 4.8 g/dL GLOBULIN, TOTAL 2.2 1.5 - 4.5 g/dL A-G Ratio 2.1 1.2 - 2.2 Bilirubin, total 0.6 0.0 - 1.2 mg/dL Alk. phosphatase 149 (H) 39 - 117 IU/L  
 AST (SGOT) 36 0 - 40 IU/L  
 ALT (SGPT) 44 (H) 0 - 32 IU/L Narrative Performed at:  [address]  [id number] : Omaira Cat MD, Phone:  [phone] LIPID PANEL Result Value Ref Range Cholesterol, total 188 100 - 199 mg/dL Triglyceride 115 0 - 149 mg/dL HDL Cholesterol 56 >39 mg/dL VLDL, calculated 23 5 - 40 mg/dL LDL, calculated 109 (H) 0 - 99 mg/dL Narrative Performed at:  [address]  [id number] : Andree Viera MD, Phone:  [phone] VITAMIN D, 25 HYDROXY Result Value Ref Range VITAMIN D, 25-HYDROXY 43.0 30.0 - 100.0 ng/mL Comment:  
   Vitamin D deficiency has been defined by the [address] [place] [address] practice guideline as a 
level of serum 25-OH vitamin D less than 20 ng/mL (1,2). The Endocrine Society went on to further define vitamin D 
insufficiency as a level between 21 and 29 ng/mL (2). 1. IOM ([place] of Medicine). 2010. Dietary reference 
   intakes for calcium and D. [address] [place] [address]: The 
   [employer]. 2. Collin MF, Nat NC, Timothy LUCIANO, et al. 
   Evaluation, treatment, and prevention of vitamin D 
   deficiency: an Endocrine Society clinical practice 
   guideline. JCEM. 2011 Jul; 96(7):1911-30. Narrative Performed at:  [address]  [id number] : Andree Viera MD, Phone:  [phone] TSH 3RD GENERATION Result Value Ref Range TSH 2.720 0.450 - 4.500 uIU/mL Narrative Performed at:  [address]  [id number] : Andree Viera MD, Phone:  [phone] HEMOGLOBIN A1C WITH EAG Result Value Ref Range Hemoglobin A1c 5.6 4.8 - 5.6 % Comment:  
            Pre-diabetes: 5.7 - 6.4 Diabetes: >6.4 Glycemic control for adults with diabetes: <7.0 Estimated average glucose 114 mg/dL Narrative Performed at:  [address]  [id number] : Andree Viera MD, Phone:  [phone] RECOMMENDATIONS: 
  Labs look good.  No longer anemic (happened due to her sx). Cholesterol levels have bumped up a bit.  Make sure taking lipitor every day.  Work on cutting back on carbs/starches as well Please call me if you have any questions: [phone] Sincerely, Isha Sheikh III, DO

## 2017-08-09 LAB
25(OH)D3+25(OH)D2 SERPL-MCNC: 43 NG/ML (ref 30–100)
ALBUMIN SERPL-MCNC: 4.6 G/DL (ref 3.5–4.8)
ALBUMIN/GLOB SERPL: 2.1 {RATIO} (ref 1.2–2.2)
ALP SERPL-CCNC: 149 IU/L (ref 39–117)
ALT SERPL-CCNC: 44 IU/L (ref 0–32)
AST SERPL-CCNC: 36 IU/L (ref 0–40)
BASOPHILS # BLD AUTO: 0 X10E3/UL (ref 0–0.2)
BASOPHILS NFR BLD AUTO: 0 %
BILIRUB SERPL-MCNC: 0.6 MG/DL (ref 0–1.2)
BUN SERPL-MCNC: 21 MG/DL (ref 8–27)
BUN/CREAT SERPL: 23 (ref 12–28)
CALCIUM SERPL-MCNC: 9.5 MG/DL (ref 8.7–10.3)
CHLORIDE SERPL-SCNC: 100 MMOL/L (ref 96–106)
CHOLEST SERPL-MCNC: 188 MG/DL (ref 100–199)
CO2 SERPL-SCNC: 25 MMOL/L (ref 18–29)
CREAT SERPL-MCNC: 0.9 MG/DL (ref 0.57–1)
EOSINOPHIL # BLD AUTO: 0.1 X10E3/UL (ref 0–0.4)
EOSINOPHIL NFR BLD AUTO: 3 %
ERYTHROCYTE [DISTWIDTH] IN BLOOD BY AUTOMATED COUNT: 13.3 % (ref 12.3–15.4)
EST. AVERAGE GLUCOSE BLD GHB EST-MCNC: 114 MG/DL
GLOBULIN SER CALC-MCNC: 2.2 G/DL (ref 1.5–4.5)
GLUCOSE SERPL-MCNC: 111 MG/DL (ref 65–99)
HBA1C MFR BLD: 5.6 % (ref 4.8–5.6)
HCT VFR BLD AUTO: 41.9 % (ref 34–46.6)
HDLC SERPL-MCNC: 56 MG/DL
HGB BLD-MCNC: 14.5 G/DL (ref 11.1–15.9)
IMM GRANULOCYTES # BLD: 0 X10E3/UL (ref 0–0.1)
IMM GRANULOCYTES NFR BLD: 0 %
LDLC SERPL CALC-MCNC: 109 MG/DL (ref 0–99)
LYMPHOCYTES # BLD AUTO: 2 X10E3/UL (ref 0.7–3.1)
LYMPHOCYTES NFR BLD AUTO: 38 %
MCH RBC QN AUTO: 31.9 PG (ref 26.6–33)
MCHC RBC AUTO-ENTMCNC: 34.6 G/DL (ref 31.5–35.7)
MCV RBC AUTO: 92 FL (ref 79–97)
MONOCYTES # BLD AUTO: 0.4 X10E3/UL (ref 0.1–0.9)
MONOCYTES NFR BLD AUTO: 8 %
NEUTROPHILS # BLD AUTO: 2.6 X10E3/UL (ref 1.4–7)
NEUTROPHILS NFR BLD AUTO: 51 %
PLATELET # BLD AUTO: 279 X10E3/UL (ref 150–379)
POTASSIUM SERPL-SCNC: 3.9 MMOL/L (ref 3.5–5.2)
PROT SERPL-MCNC: 6.8 G/DL (ref 6–8.5)
RBC # BLD AUTO: 4.54 X10E6/UL (ref 3.77–5.28)
SODIUM SERPL-SCNC: 144 MMOL/L (ref 134–144)
TRIGL SERPL-MCNC: 115 MG/DL (ref 0–149)
TSH SERPL DL<=0.005 MIU/L-ACNC: 2.72 UIU/ML (ref 0.45–4.5)
VLDLC SERPL CALC-MCNC: 23 MG/DL (ref 5–40)
WBC # BLD AUTO: 5.1 X10E3/UL (ref 3.4–10.8)

## 2017-08-09 NOTE — PROGRESS NOTES
Labs look good. No longer anemic (happened due to her sx). Cholesterol levels have bumped up a bit. Make sure taking lipitor every day. Work on cutting back on carbs/starches as well.

## 2017-08-09 NOTE — PROGRESS NOTES
I have attempted to contact this patient by phone with the following results:   Labs look good.  No longer anemic (happened due to her sx). Cholesterol levels have bumped up a bit.  Make sure taking lipitor every day.  Work on cutting back on carbs/starches as well  Results mailed to patient's home.

## 2017-08-10 RX ORDER — ATORVASTATIN CALCIUM 80 MG/1
TABLET, FILM COATED ORAL
Qty: 90 TAB | Refills: 3 | Status: SHIPPED | OUTPATIENT
Start: 2017-08-10

## 2017-08-11 ENCOUNTER — HOSPITAL ENCOUNTER (OUTPATIENT)
Dept: ULTRASOUND IMAGING | Age: 72
Discharge: HOME | End: 2017-08-11
Attending: INTERNAL MEDICINE
Payer: MEDICARE

## 2017-08-11 DIAGNOSIS — I73.9 PAD (PERIPHERAL ARTERY DISEASE) (HCC): ICD-10-CM

## 2017-08-11 PROCEDURE — 93922 UPR/L XTREMITY ART 2 LEVELS: CPT

## 2017-08-14 NOTE — PROGRESS NOTES
Reviewed results with patient per Dr. Alfred Eckert. I have reviewed the provider's instructions with the patient, answering all questions to her satisfaction.

## 2017-08-23 RX ORDER — MELOXICAM 15 MG/1
TABLET ORAL
Qty: 90 TAB | Refills: 3 | Status: SHIPPED | OUTPATIENT
Start: 2017-08-23

## 2017-12-24 RX ORDER — DILTIAZEM HYDROCHLORIDE 240 MG/1
CAPSULE, EXTENDED RELEASE ORAL
Qty: 90 CAP | Refills: 3 | Status: SHIPPED | OUTPATIENT
Start: 2017-12-24

## 2018-02-18 RX ORDER — LOSARTAN POTASSIUM AND HYDROCHLOROTHIAZIDE 25; 100 MG/1; MG/1
TABLET ORAL
Qty: 90 TAB | Refills: 3 | Status: SHIPPED | OUTPATIENT
Start: 2018-02-18

## 2018-04-03 ENCOUNTER — HOSPITAL ENCOUNTER (OUTPATIENT)
Dept: MAMMOGRAPHY | Age: 73
Discharge: HOME | End: 2018-04-03
Attending: INTERNAL MEDICINE
Payer: MEDICARE

## 2018-04-03 DIAGNOSIS — Z12.39 BREAST SCREENING: ICD-10-CM

## 2018-04-03 PROCEDURE — 77067 SCR MAMMO BI INCL CAD: CPT

## 2018-12-20 ENCOUNTER — ANESTHESIA (OUTPATIENT)
Dept: ENDOSCOPY | Age: 73
End: 2018-12-20
Payer: MEDICARE

## 2018-12-20 ENCOUNTER — HOSPITAL ENCOUNTER (OUTPATIENT)
Age: 73
Setting detail: OUTPATIENT SURGERY
Discharge: HOME | End: 2018-12-20
Attending: SPECIALIST | Admitting: SPECIALIST
Payer: MEDICARE

## 2018-12-20 ENCOUNTER — ANESTHESIA EVENT (OUTPATIENT)
Dept: ENDOSCOPY | Age: 73
End: 2018-12-20
Payer: MEDICARE

## 2018-12-20 VITALS
BODY MASS INDEX: 30.46 KG/M2 | TEMPERATURE: 98.3 F | HEIGHT: 61 IN | HEART RATE: 61 BPM | DIASTOLIC BLOOD PRESSURE: 77 MMHG | WEIGHT: 161.31 LBS | OXYGEN SATURATION: 100 % | SYSTOLIC BLOOD PRESSURE: 141 MMHG | RESPIRATION RATE: 11 BRPM

## 2018-12-20 LAB
H PYLORI FROM TISSUE: NEGATIVE
KIT LOT NO., HCLOLOT: NORMAL
NEGATIVE CONTROL: NEGATIVE
POSITIVE CONTROL: POSITIVE

## 2018-12-20 PROCEDURE — 77030009426 HC FCPS BIOP ENDOSC BSC -B: Performed by: SPECIALIST

## 2018-12-20 PROCEDURE — 88342 IMHCHEM/IMCYTCHM 1ST ANTB: CPT

## 2018-12-20 PROCEDURE — 74011250636 HC RX REV CODE- 250/636

## 2018-12-20 PROCEDURE — 76040000003: Performed by: SPECIALIST

## 2018-12-20 PROCEDURE — 88305 TISSUE EXAM BY PATHOLOGIST: CPT

## 2018-12-20 PROCEDURE — 87077 CULTURE AEROBIC IDENTIFY: CPT | Performed by: SPECIALIST

## 2018-12-20 PROCEDURE — 76060000035 HC ANESTHESIA 2 TO 2.5 HR: Performed by: SPECIALIST

## 2018-12-20 RX ORDER — SODIUM CHLORIDE 0.9 % (FLUSH) 0.9 %
5-10 SYRINGE (ML) INJECTION EVERY 8 HOURS
Status: DISCONTINUED | OUTPATIENT
Start: 2018-12-20 | End: 2018-12-20 | Stop reason: HOSPADM

## 2018-12-20 RX ORDER — FENTANYL CITRATE 50 UG/ML
200 INJECTION, SOLUTION INTRAMUSCULAR; INTRAVENOUS
Status: CANCELLED | OUTPATIENT
Start: 2018-12-20 | End: 2018-12-20

## 2018-12-20 RX ORDER — LIDOCAINE HYDROCHLORIDE 20 MG/ML
INJECTION, SOLUTION EPIDURAL; INFILTRATION; INTRACAUDAL; PERINEURAL AS NEEDED
Status: DISCONTINUED | OUTPATIENT
Start: 2018-12-20 | End: 2018-12-20 | Stop reason: HOSPADM

## 2018-12-20 RX ORDER — EPINEPHRINE 0.1 MG/ML
1 INJECTION INTRACARDIAC; INTRAVENOUS
Status: CANCELLED | OUTPATIENT
Start: 2018-12-20 | End: 2018-12-21

## 2018-12-20 RX ORDER — SODIUM CHLORIDE 0.9 % (FLUSH) 0.9 %
5-10 SYRINGE (ML) INJECTION AS NEEDED
Status: DISCONTINUED | OUTPATIENT
Start: 2018-12-20 | End: 2018-12-20 | Stop reason: HOSPADM

## 2018-12-20 RX ORDER — MIDAZOLAM HYDROCHLORIDE 1 MG/ML
.25-1 INJECTION, SOLUTION INTRAMUSCULAR; INTRAVENOUS
Status: CANCELLED | OUTPATIENT
Start: 2018-12-20 | End: 2018-12-20

## 2018-12-20 RX ORDER — SODIUM CHLORIDE 9 MG/ML
50 INJECTION, SOLUTION INTRAVENOUS CONTINUOUS
Status: CANCELLED | OUTPATIENT
Start: 2018-12-20 | End: 2018-12-20

## 2018-12-20 RX ORDER — SODIUM CHLORIDE 9 MG/ML
INJECTION, SOLUTION INTRAVENOUS
Status: DISCONTINUED | OUTPATIENT
Start: 2018-12-20 | End: 2018-12-20 | Stop reason: HOSPADM

## 2018-12-20 RX ORDER — PROPOFOL 10 MG/ML
INJECTION, EMULSION INTRAVENOUS AS NEEDED
Status: DISCONTINUED | OUTPATIENT
Start: 2018-12-20 | End: 2018-12-20 | Stop reason: HOSPADM

## 2018-12-20 RX ORDER — NALOXONE HYDROCHLORIDE 0.4 MG/ML
0.4 INJECTION, SOLUTION INTRAMUSCULAR; INTRAVENOUS; SUBCUTANEOUS
Status: CANCELLED | OUTPATIENT
Start: 2018-12-20 | End: 2018-12-20

## 2018-12-20 RX ORDER — DEXTROMETHORPHAN/PSEUDOEPHED 2.5-7.5/.8
1.2 DROPS ORAL
Status: CANCELLED | OUTPATIENT
Start: 2018-12-20

## 2018-12-20 RX ORDER — ATROPINE SULFATE 0.1 MG/ML
0.5 INJECTION INTRAVENOUS
Status: CANCELLED | OUTPATIENT
Start: 2018-12-20 | End: 2018-12-21

## 2018-12-20 RX ORDER — FLUMAZENIL 0.1 MG/ML
0.2 INJECTION INTRAVENOUS
Status: CANCELLED | OUTPATIENT
Start: 2018-12-20 | End: 2018-12-20

## 2018-12-20 RX ADMIN — SODIUM CHLORIDE: 9 INJECTION, SOLUTION INTRAVENOUS at 16:45

## 2018-12-20 RX ADMIN — PROPOFOL 20 MG: 10 INJECTION, EMULSION INTRAVENOUS at 17:15

## 2018-12-20 RX ADMIN — PROPOFOL 20 MG: 10 INJECTION, EMULSION INTRAVENOUS at 17:17

## 2018-12-20 RX ADMIN — LIDOCAINE HYDROCHLORIDE 60 MG: 20 INJECTION, SOLUTION EPIDURAL; INFILTRATION; INTRACAUDAL; PERINEURAL at 17:10

## 2018-12-20 RX ADMIN — PROPOFOL 20 MG: 10 INJECTION, EMULSION INTRAVENOUS at 17:20

## 2018-12-20 RX ADMIN — PROPOFOL 20 MG: 10 INJECTION, EMULSION INTRAVENOUS at 17:23

## 2018-12-20 RX ADMIN — PROPOFOL 10 MG: 10 INJECTION, EMULSION INTRAVENOUS at 17:12

## 2018-12-20 RX ADMIN — PROPOFOL 80 MG: 10 INJECTION, EMULSION INTRAVENOUS at 17:10

## 2018-12-20 NOTE — ANESTHESIA PREPROCEDURE EVALUATION
Anesthetic History   No history of anesthetic complications            Review of Systems / Medical History  Patient summary reviewed, nursing notes reviewed and pertinent labs reviewed    Pulmonary            Asthma        Neuro/Psych              Cardiovascular    Hypertension          CAD    Exercise tolerance: >4 METS     GI/Hepatic/Renal               Comments: Ab pain Endo/Other        Arthritis     Other Findings              Physical Exam    Airway  Mallampati: I  TM Distance: > 6 cm  Neck ROM: normal range of motion   Mouth opening: Normal     Cardiovascular    Rhythm: regular  Rate: normal         Dental  No notable dental hx       Pulmonary  Breath sounds clear to auscultation               Abdominal         Other Findings            Anesthetic Plan    ASA: 2  Anesthesia type: MAC          Induction: Intravenous  Anesthetic plan and risks discussed with: Patient

## 2018-12-20 NOTE — PROGRESS NOTES
Endoscope was pre-cleaned at bedside immediately following procedure by DESTINY MONTEIRO RN. Kalee Arthur

## 2018-12-20 NOTE — ANESTHESIA POSTPROCEDURE EVALUATION
Procedure(s):  ESOPHAGOGASTRODUODENOSCOPY (EGD)  ESOPHAGOGASTRODUODENAL (EGD) BIOPSY  ESOPHAGEAL DILATION. Anesthesia Post Evaluation        Comments: Post-Anesthesia Evaluation and Assessment    I have evaluated the patient and they are ready for PACU discharge. Patient: Joe Tovar MRN: [id number]  SSN: [id number]   YOB: 1945  Age: 75 y.o. Sex: female      Cardiovascular Function/Vital Signs  /70   Pulse 61   Temp 36.8 °C (98.3 °F)   Resp 15   Ht 5' 1\" (1.549 m)   Wt 73.2 kg (161 lb 5 oz)   SpO2 97%   Breastfeeding? No   BMI 30.48 kg/m²     Patient is status post MAC anesthesia for Procedure(s):  ESOPHAGOGASTRODUODENOSCOPY (EGD)  ESOPHAGOGASTRODUODENAL (EGD) BIOPSY  ESOPHAGEAL DILATION. Nausea/Vomiting: None    Postoperative hydration reviewed and adequate. Pain:  Pain Scale 1: Numeric (0 - 10) (12/20/18 1454)  Pain Intensity 1: 0 (12/20/18 1454)   Managed    Neurological Status: At baseline    Mental Status, Level of Consciousness: Alert and  oriented to person, place, and time    Pulmonary Status:   O2 Device: Nasal cannula (12/20/18 5896)   Adequate oxygenation and airway patent    Complications related to anesthesia: None    Post-anesthesia assessment completed. No concerns    Signed By: Raúl Norman MD    December 20, 2018                   Visit Vitals  /70   Pulse 61   Temp 36.8 °C (98.3 °F)   Resp 15   Ht 5' 1\" (1.549 m)   Wt 73.2 kg (161 lb 5 oz)   SpO2 97%   Breastfeeding?  No   BMI 30.48 kg/m²

## 2018-12-20 NOTE — ROUTINE PROCESS
[facility]  1945  [id number]    Situation:  Verbal report received from: RN Kelsey  Procedure: Procedure(s):  ESOPHAGOGASTRODUODENOSCOPY (EGD)  ESOPHAGOGASTRODUODENAL (EGD) BIOPSY  ESOPHAGEAL DILATION    Background:    Preoperative diagnosis: Abdominal pain  Postoperative diagnosis: GASTRIC ULCERS    :  Dr. Nghia Lundy  Assistant(s): Endoscopy RN-1: Kiarra Matthew RN  Endoscopy RN-2: Nate Kang RN    Specimens:   ID Type Source Tests Collected by Time Destination   1 : pathology Preservative Gastric  Maribel Domingo MD 12/20/2018 1721 Pathology   2 : [address]   Maribel Domingo MD 12/20/2018 1721 Pathology     H. Pylori  no    Assessment:  Intra-procedure medications       Anesthesia gave intra-procedure sedation and medications, see anesthesia flow sheet yes    Intravenous fluids:  300  NS @ KVO     Vital signs stable yes    Abdominal assessment: round and soft yes    Recommendation:  Discharge patient per MD order yes.   Return to floor no  Family or Friend : family  Permission to share finding with family or friend yes

## 2018-12-20 NOTE — PROCEDURES
[address]  [address]                 NAME:  Matias Linton   :   1945   MRN:   [id number]     Date/Time:  2018 5:30 PM    Esophagogastroduodenoscopy (EGD) Procedure Note    :  Alondra James MD    Referring Provider:  Rhona Joe MD    Anethesia/Sedation:  MAC anesthesia Propofol    Preoperative diagnosis: Abdominal pain    Postoperative diagnosis: GASTRIC ULCERS    Procedure Details     After infom consent was obtained for the procedure, with all risks and benefits of procedure explained the patient was taken to the endoscopy suite and placed in the left lateral decubitus position. Following sequential administration of sedation as per above, the [id number] gastroscope was inserted into the mouth and advanced under direct vision to second portion of the duodenum. A careful inspection was made as the gastroscope was withdrawn, including a retroflexed view of the proximal stomach; findings and interventions are described below. Findings:  Esophagus:normal mucosa, random biopsies done. Esophagus dilated with 51 F Savary dilator. Stomach: 4 mm non bleeding benign ulcer in antrum, FIDEL and biopsies done. Three mm opening seen in antrum possibly a pseudo-diverticulum or a fistula. Duodenum/jejunum:normal      Therapies:  As above    Specimens: Gastric, esophageal biopsies           EBL: None    Complications:   None; patient tolerated the procedure well. Impression:    See Postoperative diagnosis above    Recommendations:  -Acid suppression with a proton pump inhibitor. , -Await pathology. , UGI series to rule out gastric fistula    Discharge disposition:  Home in the company of  when able to ambulate    Alondra James MD

## 2018-12-20 NOTE — DISCHARGE INSTRUCTIONS
Thony Shane  [id number]  1945    EGD DISCHARGE INSTRUCTIONS  Discomfort:  Sore throat- throat lozenges or warm salt water gargle  redness at IV site- apply warm compress to area; if redness or soreness persist- contact your physician  Gaseous discomfort- walking, belching will help relieve any discomfort  You may not operate a vehicle for 12 hours  You may not engage in an occupation involving machinery or appliances for rest of today. You may not drink alcoholic beverages for at least 12 hours  Avoid making any critical decisions for at least 24 hour  DIET  You may resume your regular diet - however -  remember your colon is empty and a heavy meal will produce gas. Avoid these foods:  vegetables, fried / greasy foods, carbonated drinks  MEDICATIONS   Regarding Aspirin or Nonsteroidal medications specifically, please see below. ACTIVITY  You may resume your normal daily activities. Spend the remainder of the day resting -  avoid any strenuous activity. CALL M.D. ANY SIGN OF   Increasing pain, nausea, vomiting  Abdominal distension (swelling)  New increased bleeding (oral or rectal)  Fever (chills)  Pain in chest area  Bloody discharge from nose or mouth  Shortness of breath    You may not  take any Advil, Aspirin, Ibuprofen, Motrin, Aleve, or Goodys for 10 days, ONLY  Tylenol as needed for pain. Follow-up Instructions:   Call Dr. Pacheco [place]  Results of procedure / biopsy in 10 days, take Pantoprazole as prescribed. Telephone #  [phone]        DISCHARGE SUMMARY from Nurse    The following personal items collected during your admission are returned to you:   Dental Appliance:    Vision: Visual Aid: None  Hearing Aid:    Jewelry:    Clothing:    Other Valuables:    Valuables sent to safe:               Gastritis: Care Instructions  Your Care Instructions    Gastritis is a sore and upset stomach. It happens when something irritates the stomach lining. Many things can cause it.  These include an infection such as the flu or something you ate or drank. Medicines or a sore on the lining of the stomach (ulcer) also can cause it. Your belly may bloat and ache. You may belch, vomit, and feel sick to your stomach. You should be able to relieve the problem by taking medicine. And it may help to change your diet. If gastritis lasts, your doctor may prescribe medicine. Follow-up care is a key part of your treatment and safety. Be sure to make and go to all appointments, and call your doctor if you are having problems. It's also a good idea to know your test results and keep a list of the medicines you take. How can you care for yourself at home? · If your doctor prescribed antibiotics, take them as directed. Do not stop taking them just because you feel better. You need to take the full course of antibiotics. · Be safe with medicines. If your doctor prescribed medicine to decrease stomach acid, take it as directed. Call your doctor if you think you are having a problem with your medicine. · Do not take any other medicine, including over-the-counter pain relievers, without talking to your doctor first.  · If your doctor recommends over-the-counter medicine to reduce stomach acid, such as Pepcid AC, Prilosec, Tagamet HB, or Zantac 75, follow the directions on the label. · Drink plenty of fluids (enough so that your urine is light yellow or clear like water) to prevent dehydration. Choose water and other caffeine-free clear liquids. If you have kidney, heart, or liver disease and have to limit fluids, talk with your doctor before you increase the amount of fluids you drink. · Limit how much alcohol you drink. · Avoid coffee, tea, cola drinks, chocolate, and other foods with caffeine. They increase stomach acid. When should you call for help? Call 911 anytime you think you may need emergency care.  For example, call if:    · You vomit blood or what looks like coffee grounds.     · You pass maroon or very bloody stools.    Call your doctor now or seek immediate medical care if:    · You start breathing fast and have not produced urine in the last 8 hours.     · You cannot keep fluids down.    Watch closely for changes in your health, and be sure to contact your doctor if:    · You do not get better as expected. Where can you learn more? Go to http://santo-juani.info/. Enter [id number] in the search box to learn more about \"Gastritis: Care Instructions. \"  Current as of: March 28, 2018  Content Version: 11.8  © 4552-5208 [employer]. Care instructions adapted under license by [employer] (which disclaims liability or warranty for this information). If you have questions about a medical condition or this instruction, always ask your healthcare professional. [address] any warranty or liability for your use of this information. Peptic Ulcer Disease: Care Instructions  Your Care Instructions    Peptic ulcers are sores on the inside of the stomach or the small intestine (such as a duodenal ulcer). They are most often caused by an infection with bacteria or from use of nonsteroidal anti-inflammatory drugs (NSAIDs). NSAIDs include aspirin, ibuprofen (Advil), and naproxen (Aleve). Your doctor may have prescribed medicine to reduce stomach acid. You also may need to take antibiotics if your peptic ulcers are caused by an infection. You can help yourself heal and keep ulcers from coming back by making some changes in your lifestyle. Quit smoking. Limit alcohol. Follow-up care is a key part of your treatment and safety. Be sure to make and go to all appointments, and call your doctor if you are having problems. It's also a good idea to know your test results and keep a list of the medicines you take. How can you care for yourself at home? · Be safe with medicines. Take your medicines exactly as prescribed.  Call your doctor if you think you are having a problem with your medicine. · Do not take aspirin or other NSAIDs such as ibuprofen (Advil or Motrin) or naproxen (Aleve). Ask your doctor what you can take for pain. · Do not smoke. Smoking can make ulcers worse. If you need help quitting, talk to your doctor about stop-smoking programs and medicines. These can increase your chances of quitting for good. · Drink in moderation or avoid drinking alcohol. · Eat a balanced diet of small, frequent meals. See a dietitian if you need help planning your meals. When should you call for help? [id number] anytime you think you may need emergency care. For example, call if:    · You vomit blood or what looks like coffee grounds.     · You pass maroon or very bloody stools.    Call your doctor now or seek immediate medical care if:    · You have new or worse belly pain.     · Your stools are black and look like tar, or they have streaks of blood.     · You vomit.    Watch closely for changes in your health, and be sure to contact your doctor if:    · You do not get better as expected. Where can you learn more? Go to http://santo-juani.info/. Enter Q294 in the search box to learn more about \"Peptic Ulcer Disease: Care Instructions. \"  Current as of: July 24, 2017  Content Version: 11.8  © 1698-4834 [employer]. Care instructions adapted under license by [employer] (which disclaims liability or warranty for this information). If you have questions about a medical condition or this instruction, always ask your healthcare professional. [place] [address] any warranty or liability for your use of this information.

## 2018-12-20 NOTE — H&P
Pre-endoscopy H and P     The patient was seen and examined in the endoscopy suite. The airway was assessed and docuemented. The problem list and medications were reviewed. Patient Active Problem List   Diagnosis Code    Pure hypercholesterolemia E78.00    Asthma J45.909    Allergic rhinitis J30.9    Vitamin D deficiency E55.9    S/P rotator cuff surgery Z98.890    Right wrist fracture S62.101A    Tinnitus of right ear H93.11    Left lumbar radiculitis M54.16    Spinal stenosis M48.00    Hx of decompressive lumbar laminectomy Z98.890    Hip osteoarthritis M16.9    Essential hypertension I10    Atherosclerosis of native coronary artery without angina pectoris I25.10    Memory disturbance R41.3    Rib pain on left side R07.81    Tinea pedis of right foot B35.3    Prediabetes R73.03    Anxiety and depression F41.9, F32.9    Constipation K59.00    Osteoporosis M81.0    Hyperglycemia R73.9     Social History     Socioeconomic History    Marital status: LEGALLY      Spouse name: Not on file    Number of children: Not on file    Years of education: Not on file    Highest education level: Not on file   Social Needs    Financial resource strain: Not on file    Food insecurity - worry: Not on file    Food insecurity - inability: Not on file   [employer] needs - medical: Not on file   [employer] needs - non-medical: Not on file   Occupational History    Not on file   Tobacco Use    Smoking status: Never Smoker    Smokeless tobacco: Never Used   Substance and Sexual Activity    Alcohol use: Yes     Alcohol/week: 0.0 oz     Comment: Social      Drug use: Yes     Types: Prescription, OTC    Sexual activity: Not Currently     Partners: Male     Birth control/protection: None   Other Topics Concern    Not on file   Social History Narrative    Not on file     Past Medical History:   Diagnosis Date    Asthma     mild, uses inhaler prn only.  No problems x > 1 year    Broken wrist 11/20/2013    right wrist    CAD (coronary artery disease) 8473,8725    stents. x2 Currently asymptomatic & exercises 5x/ week    Cancer ([address]     skin, mole removed    DDD (degenerative disc disease)     DJD (degenerative joint disease)     Environmental allergies     Hypercholesterolemia     Hypertension     Osteopenia     Recurrent UTI          Prior to Admission Medications   Prescriptions Last Dose Informant Patient Reported? Taking? BENEFIBER, GUAR GUM, PO Not Taking at Unknown time Self Yes No   Sig: Take 2 Tabs by mouth daily. CARTIA  mg ER capsule 12/20/2018 at Unknown time  No Yes   Sig: TAKE 1 CAPSULE EVERY DAY   Cetirizine (ZYRTEC) 10 mg Cap 12/20/2018 at Unknown time Self Yes Yes   Sig: Take 10 mg by mouth daily. Indications: ALLERGIC RHINITIS   MULTIVITAMIN PO 12/19/2018 at Unknown time Self Yes Yes   Sig: Take 1 Tab by mouth daily. aspirin delayed-release 81 mg tablet 12/20/2018 at Unknown time  No Yes   Sig: Take 1 Tab by mouth daily. atorvastatin (LIPITOR) 80 mg tablet 12/20/2018 at Unknown time  No Yes   Sig: TAKE 1 TABLET EVERY DAY   doxazosin (CARDURA) 2 mg tablet 12/19/2018 at Unknown time  Yes Yes   Sig: Take 2 mg by mouth nightly. escitalopram oxalate (LEXAPRO) 10 mg tablet 12/20/2018 at Unknown time  No Yes   Sig: Take 1 Tab by mouth daily. furosemide (LASIX) 40 mg tablet 12/20/2018 at Unknown time  No Yes   Sig: TAKE 1 TABLET BY MOUTH DAILY AS NEEDED FOR SWELLING   gabapentin (NEURONTIN) 300 mg capsule 12/19/2018 at Unknown time Self Yes Yes   Sig: Take 300 mg by mouth two (2) times a day. Indications: NEUROPATHIC PAIN   losartan-hydroCHLOROthiazide (HYZAAR) 100-25 mg per tablet 12/20/2018 at Unknown time  No Yes   Sig: TAKE 1 TABLET EVERY DAY   meloxicam (MOBIC) 15 mg tablet 12/20/2018 at Unknown time  No Yes   Sig: TAKE 1 TABLET EVERY DAY   senna-docusate (PERICOLACE) 8.6-50 mg per tablet   No No   Sig: Take 1 Tab by mouth daily.    triamcinolone (NASACORT AQ) 55 mcg nasal inhaler 2018 Self Yes No   Si [place] by Both Nostrils route daily. Facility-Administered Medications: None       Chief complaint, history of present illness, and review of systems and Past medical History are positive for: dysphagia, epigastric pain and hepatomegaly    The heart, lungs and mental status were satisfactory for the administration of sedation and for the procedure. I discussed with the patient the objectives, risks, consequences and alternatives to the procedure.      Plan: Endoscopic procedure with sedation     Leandra Quiñones MD   2018  5:09 PM

## 2019-01-08 ENCOUNTER — HOSPITAL ENCOUNTER (OUTPATIENT)
Dept: GENERAL RADIOLOGY | Age: 74
Discharge: HOME | End: 2019-01-08
Attending: SPECIALIST
Payer: MEDICARE

## 2019-01-08 DIAGNOSIS — R10.13 EPIGASTRIC PAIN: ICD-10-CM

## 2019-01-08 DIAGNOSIS — R13.10 DYSPHAGIA: ICD-10-CM

## 2019-01-08 PROCEDURE — 74249 XR UPPER GI AIR CONT/SMALL BOWEL: CPT

## 2019-07-03 ENCOUNTER — HOSPITAL ENCOUNTER (OUTPATIENT)
Dept: MAMMOGRAPHY | Age: 74
Discharge: HOME | End: 2019-07-03
Attending: INTERNAL MEDICINE
Payer: MEDICARE

## 2019-07-03 DIAGNOSIS — Z12.39 BREAST SCREENING, UNSPECIFIED: ICD-10-CM

## 2019-07-03 PROCEDURE — 77063 BREAST TOMOSYNTHESIS BI: CPT

## 2019-09-13 ENCOUNTER — HOSPITAL ENCOUNTER (OUTPATIENT)
Dept: CT IMAGING | Age: 74
Discharge: HOME | End: 2019-09-13
Attending: SPECIALIST
Payer: MEDICARE

## 2019-09-13 DIAGNOSIS — R10.10 UPPER ABDOMINAL PAIN: ICD-10-CM

## 2019-09-13 DIAGNOSIS — K25.9 GASTRIC ULCER: ICD-10-CM

## 2019-09-13 DIAGNOSIS — R16.0 HEPATOMEGALY: ICD-10-CM

## 2019-09-13 PROCEDURE — 74011000258 HC RX REV CODE- 258: Performed by: RADIOLOGY

## 2019-09-13 PROCEDURE — 74177 CT ABD & PELVIS W/CONTRAST: CPT

## 2019-09-13 PROCEDURE — 74011636320 HC RX REV CODE- 636/320: Performed by: RADIOLOGY

## 2019-09-13 RX ORDER — SODIUM CHLORIDE 0.9 % (FLUSH) 0.9 %
10 SYRINGE (ML) INJECTION
Status: COMPLETED | OUTPATIENT
Start: 2019-09-13 | End: 2019-09-13

## 2019-09-13 RX ADMIN — IOHEXOL 50 ML: 240 INJECTION, SOLUTION INTRATHECAL; INTRAVASCULAR; INTRAVENOUS; ORAL at 11:45

## 2019-09-13 RX ADMIN — IOPAMIDOL 100 ML: 755 INJECTION, SOLUTION INTRAVENOUS at 11:45

## 2019-09-13 RX ADMIN — Medication 10 ML: at 11:45

## 2019-09-13 RX ADMIN — SODIUM CHLORIDE 100 ML: 900 INJECTION, SOLUTION INTRAVENOUS at 11:45

## 2019-10-14 RX ORDER — FLUTICASONE PROPIONATE 50 MCG
2 SPRAY, SUSPENSION (ML) NASAL DAILY
COMMUNITY

## 2019-10-14 RX ORDER — ACETAMINOPHEN 500 MG
1000 TABLET ORAL
COMMUNITY

## 2019-10-14 RX ORDER — TRIAMCINOLONE ACETONIDE 0.25 MG/G
CREAM TOPICAL
COMMUNITY

## 2019-10-14 RX ORDER — DICLOFENAC SODIUM 10 MG/G
GEL TOPICAL 2 TIMES DAILY
COMMUNITY

## 2019-10-14 RX ORDER — PANTOPRAZOLE SODIUM 40 MG/1
40 TABLET, DELAYED RELEASE ORAL DAILY
COMMUNITY

## 2019-10-14 RX ORDER — TRETINOIN 0.25 MG/G
CREAM TOPICAL
COMMUNITY

## 2019-10-15 ENCOUNTER — HOSPITAL ENCOUNTER (OUTPATIENT)
Age: 74
Setting detail: OUTPATIENT SURGERY
Discharge: HOME | End: 2019-10-15
Attending: SPECIALIST | Admitting: SPECIALIST
Payer: MEDICARE

## 2019-10-15 ENCOUNTER — ANESTHESIA EVENT (OUTPATIENT)
Dept: ENDOSCOPY | Age: 74
End: 2019-10-15
Payer: MEDICARE

## 2019-10-15 ENCOUNTER — ANESTHESIA (OUTPATIENT)
Dept: ENDOSCOPY | Age: 74
End: 2019-10-15
Payer: MEDICARE

## 2019-10-15 VITALS
RESPIRATION RATE: 17 BRPM | HEART RATE: 51 BPM | TEMPERATURE: 97.8 F | HEIGHT: 61 IN | SYSTOLIC BLOOD PRESSURE: 138 MMHG | WEIGHT: 161 LBS | OXYGEN SATURATION: 95 % | DIASTOLIC BLOOD PRESSURE: 75 MMHG | BODY MASS INDEX: 30.4 KG/M2

## 2019-10-15 PROCEDURE — 74011000250 HC RX REV CODE- 250

## 2019-10-15 PROCEDURE — 76060000031 HC ANESTHESIA FIRST 0.5 HR: Performed by: SPECIALIST

## 2019-10-15 PROCEDURE — 77030021593 HC FCPS BIOP ENDOSC BSC -A: Performed by: SPECIALIST

## 2019-10-15 PROCEDURE — 87077 CULTURE AEROBIC IDENTIFY: CPT | Performed by: SPECIALIST

## 2019-10-15 PROCEDURE — 76040000019: Performed by: SPECIALIST

## 2019-10-15 PROCEDURE — 88305 TISSUE EXAM BY PATHOLOGIST: CPT

## 2019-10-15 PROCEDURE — 74011250636 HC RX REV CODE- 250/636

## 2019-10-15 RX ORDER — SODIUM CHLORIDE 0.9 % (FLUSH) 0.9 %
5-40 SYRINGE (ML) INJECTION AS NEEDED
Status: CANCELLED | OUTPATIENT
Start: 2019-10-15

## 2019-10-15 RX ORDER — SODIUM CHLORIDE 9 MG/ML
INJECTION, SOLUTION INTRAVENOUS
Status: DISCONTINUED | OUTPATIENT
Start: 2019-10-15 | End: 2019-10-15 | Stop reason: HOSPADM

## 2019-10-15 RX ORDER — DEXTROMETHORPHAN/PSEUDOEPHED 2.5-7.5/.8
1.2 DROPS ORAL
Status: CANCELLED | OUTPATIENT
Start: 2019-10-15

## 2019-10-15 RX ORDER — FLUMAZENIL 0.1 MG/ML
0.2 INJECTION INTRAVENOUS
Status: CANCELLED | OUTPATIENT
Start: 2019-10-15 | End: 2019-10-15

## 2019-10-15 RX ORDER — NALOXONE HYDROCHLORIDE 0.4 MG/ML
0.4 INJECTION, SOLUTION INTRAMUSCULAR; INTRAVENOUS; SUBCUTANEOUS
Status: CANCELLED | OUTPATIENT
Start: 2019-10-15 | End: 2019-10-15

## 2019-10-15 RX ORDER — LIDOCAINE HYDROCHLORIDE 20 MG/ML
INJECTION, SOLUTION EPIDURAL; INFILTRATION; INTRACAUDAL; PERINEURAL AS NEEDED
Status: DISCONTINUED | OUTPATIENT
Start: 2019-10-15 | End: 2019-10-15 | Stop reason: HOSPADM

## 2019-10-15 RX ORDER — PROPOFOL 10 MG/ML
INJECTION, EMULSION INTRAVENOUS AS NEEDED
Status: DISCONTINUED | OUTPATIENT
Start: 2019-10-15 | End: 2019-10-15 | Stop reason: HOSPADM

## 2019-10-15 RX ORDER — SODIUM CHLORIDE 9 MG/ML
50 INJECTION, SOLUTION INTRAVENOUS CONTINUOUS
Status: CANCELLED | OUTPATIENT
Start: 2019-10-15 | End: 2019-10-15

## 2019-10-15 RX ORDER — SODIUM CHLORIDE 0.9 % (FLUSH) 0.9 %
5-40 SYRINGE (ML) INJECTION EVERY 8 HOURS
Status: CANCELLED | OUTPATIENT
Start: 2019-10-15

## 2019-10-15 RX ORDER — EPINEPHRINE 0.1 MG/ML
1 INJECTION INTRACARDIAC; INTRAVENOUS
Status: CANCELLED | OUTPATIENT
Start: 2019-10-15 | End: 2019-10-16

## 2019-10-15 RX ORDER — ATROPINE SULFATE 0.1 MG/ML
0.5 INJECTION INTRAVENOUS
Status: CANCELLED | OUTPATIENT
Start: 2019-10-15 | End: 2019-10-16

## 2019-10-15 RX ADMIN — SODIUM CHLORIDE: 9 INJECTION, SOLUTION INTRAVENOUS at 11:23

## 2019-10-15 RX ADMIN — PROPOFOL 50 MG: 10 INJECTION, EMULSION INTRAVENOUS at 11:41

## 2019-10-15 RX ADMIN — PROPOFOL 30 MG: 10 INJECTION, EMULSION INTRAVENOUS at 11:38

## 2019-10-15 RX ADMIN — PROPOFOL 50 MG: 10 INJECTION, EMULSION INTRAVENOUS at 11:44

## 2019-10-15 RX ADMIN — LIDOCAINE HYDROCHLORIDE 40 MG: 20 INJECTION, SOLUTION EPIDURAL; INFILTRATION; INTRACAUDAL; PERINEURAL at 11:38

## 2019-10-15 RX ADMIN — PROPOFOL 70 MG: 10 INJECTION, EMULSION INTRAVENOUS at 11:37

## 2019-10-15 NOTE — H&P
Pre-endoscopy H and P     The patient was seen and examined in the endoscopy suite. The airway was assessed and docuemented. The problem list and medications were reviewed. Patient Active Problem List   Diagnosis Code    Pure hypercholesterolemia E78.00    Asthma J45.909    Allergic rhinitis J30.9    Vitamin D deficiency E55.9    S/P rotator cuff surgery Z98.890    Right wrist fracture S62.101A    Tinnitus of right ear H93.11    Left lumbar radiculitis M54.16    Spinal stenosis M48.00    Hx of decompressive lumbar laminectomy Z98.890    Hip osteoarthritis M16.9    Essential hypertension I10    Atherosclerosis of native coronary artery without angina pectoris I25.10    Memory disturbance R41.3    Rib pain on left side R07.81    Tinea pedis of right foot B35.3    Prediabetes R73.03    Anxiety and depression F41.9, F32.9    Constipation K59.00    Osteoporosis M81.0    Hyperglycemia R73.9     Social History     Socioeconomic History    Marital status: LEGALLY      Spouse name: Not on file    Number of children: Not on file    Years of education: Not on file    Highest education level: Not on file   Occupational History    Not on file   Social Needs    Financial resource strain: Not on file    Food insecurity:     Worry: Not on file     Inability: Not on file    Transportation needs:     Medical: Not on file     Non-medical: Not on file   Tobacco Use    Smoking status: Never Smoker    Smokeless tobacco: Never Used   Substance and Sexual Activity    Alcohol use:  Yes     Alcohol/week: 0.0 standard drinks     Comment: Social      Drug use: Yes     Types: Prescription, OTC    Sexual activity: Not Currently     Partners: Male     Birth control/protection: None   Lifestyle    Physical activity:     Days per week: Not on file     Minutes per session: Not on file    Stress: Not on file   Relationships    Social connections:     Talks on phone: Not on file     Gets together: Not on file     Attends Yazidism service: Not on file     Active member of club or organization: Not on file     Attends meetings of clubs or organizations: Not on file     Relationship status: Not on file    Intimate partner violence:     Fear of current or ex partner: Not on file     Emotionally abused: Not on file     Physically abused: Not on file     Forced sexual activity: Not on file   Other Topics Concern    Not on file   Social History Narrative    Not on file     Past Medical History:   Diagnosis Date    Asthma     mild, uses inhaler prn only. No problems x > 1 year/does not use inhaler now    Broken wrist 11/20/2013    right wrist    CAD (coronary artery disease) 7676,8227    stents. x2 Currently asymptomatic & exercises 5x/ week    Cancer ([facility] [address] [address]     skin, mole removed    DDD (degenerative disc disease)     DJD (degenerative joint disease)     Environmental allergies     GERD (gastroesophageal reflux disease)     Hypercholesterolemia     Hypertension     Osteopenia     PUD (peptic ulcer disease)     one in esophagus/two in stomach    Recurrent UTI          Prior to Admission Medications   Prescriptions Last Dose Informant Patient Reported? Taking? BENEFIBER, GUAR GUM, PO Unknown at Unknown time Self Yes No   Sig: Take 2 Tabs by mouth as needed. CARTIA  mg ER capsule 10/15/2019 at Unknown time  No Yes   Sig: TAKE 1 CAPSULE EVERY DAY   Cetirizine (ZYRTEC) 10 mg Cap 10/15/2019 at Unknown time Self Yes Yes   Sig: Take 10 mg by mouth daily. Indications: ALLERGIC RHINITIS   MULTIVITAMIN PO 10/8/2019 at Unknown time Self Yes Yes   Sig: Take 1 Tab by mouth daily. When remembered. For women. acetaminophen (TYLENOL EXTRA STRENGTH) 500 mg tablet 10/8/2019 at Unknown time  Yes Yes   Sig: Take 1,000 mg by mouth nightly as needed for Pain. aspirin delayed-release 81 mg tablet Not Taking at Unknown time  No No   Sig: Take 1 Tab by mouth daily.    atorvastatin (LIPITOR) 80 mg tablet 10/15/2019 at Unknown time  No Yes   Sig: TAKE 1 TABLET EVERY DAY   diclofenac (VOLTAREN) 1 % gel 10/14/2019 at Unknown time  Yes Yes   Sig: Apply  to affected area two (2) times a day. doxazosin (CARDURA) 2 mg tablet 10/14/2019 at Unknown time  Yes Yes   Sig: Take 2 mg by mouth nightly. escitalopram oxalate (LEXAPRO) 10 mg tablet 10/14/2019 at Unknown time  No Yes   Sig: Take 1 Tab by mouth daily. fluticasone propionate (FLONASE) 50 mcg/actuation nasal spray 10/14/2019 at Unknown time  Yes Yes   Si Sprays by Both Nostrils route daily. losartan-hydroCHLOROthiazide (HYZAAR) 100-25 mg per tablet 10/15/2019 at Unknown time  No Yes   Sig: TAKE 1 TABLET EVERY DAY   meloxicam (MOBIC) 15 mg tablet 10/14/2019 at Unknown time  No Yes   Sig: TAKE 1 TABLET EVERY DAY   pantoprazole (PROTONIX) 40 mg tablet 10/15/2019 at Unknown time  Yes Yes   Sig: Take 40 mg by mouth daily. propylene glycol (SYSTANE BALANCE OP) 10/14/2019 at Unknown time  Yes Yes   Sig: Administer 1 Drop to both eyes daily. tretinoin (RETIN-A) 0.025 % topical cream 10/14/2019 at Unknown time  Yes Yes   Sig: Apply  to affected area three (3) days a week. Sun, Mon, Tues   triamcinolone acetonide (KENALOG) 0.025 % topical cream 10/14/2019 at Unknown time  Yes Yes   Sig: Apply  to affected area four (4) days a week. use thin layer. Colon Sieve, Fri, Sat. Facility-Administered Medications: None       Chief complaint, history of present illness, and review of systems and Past medical History are positive for: Abdominal pain and gastric ulcer    The heart, lungs and mental status were satisfactory for the administration of sedation and for the procedure. I discussed with the patient the objectives, risks, consequences and alternatives to the procedure.      Plan: Endoscopic procedure with sedation     Monica Gardner MD   10/15/2019  11:34 AM

## 2019-10-15 NOTE — PROCEDURES
[address]  [address]                 NAME:  Dylan Vang   :   1945   MRN:   [id number]     Date/Time:  10/15/2019 11:45 AM    Esophagogastroduodenoscopy (EGD) Procedure Note    :  Laina Benton MD    Referring Provider:  Maia Angel MD    Anethesia/Sedation:  MAC anesthesia Propofol    Preoperative diagnosis: EPIGASTRIC PAIN , ABDOMINAL PAIN , GASTRIC ULCER HISTORY OF , O/E HEPATOMEGALY , DYSPHAGIA    Postoperative diagnosis: Gastric ulcer    Procedure Details     After infom consent was obtained for the procedure, with all risks and benefits of procedure explained the patient was taken to the endoscopy suite and placed in the left lateral decubitus position. Following sequential administration of sedation as per above, the [id number] gastroscope was inserted into the mouth and advanced under direct vision to second portion of the duodenum. A careful inspection was made as the gastroscope was withdrawn, including a retroflexed view of the proximal stomach; findings and interventions are described below. Findings:  Esophagus:normal  Stomach:5 mm ulcer in antrum, FIDEL and biopsies done  Duodenum/jejunum:normal      Therapies:  none    Specimens: FIDEL, antral bx           EBL: None    Complications:   None; patient tolerated the procedure well. Impression:    See Postoperative diagnosis above    Recommendations:  -Acid suppression with a proton pump inhibitor. , -Await pathology. , -No NSAIDS    Discharge disposition:  Home in the company of  when able to ambulate    Laina Benton MD

## 2019-10-15 NOTE — DISCHARGE INSTRUCTIONS
Em Mandujano  [id number]  1945    EGD DISCHARGE INSTRUCTIONS  Discomfort:  Sore throat- throat lozenges or warm salt water gargle  redness at IV site- apply warm compress to area; if redness or soreness persist- contact your physician  Gaseous discomfort- walking, belching will help relieve any discomfort  You may not operate a vehicle for 12 hours  You may not engage in an occupation involving machinery or appliances for rest of today. You may not drink alcoholic beverages for at least 12 hours  Avoid making any critical decisions for at least 24 hour  DIET  You may resume your regular diet - however -  remember your colon is empty and a heavy meal will produce gas. Avoid these foods:  vegetables, fried / greasy foods, carbonated drinks  MEDICATIONS   Regarding Aspirin or Nonsteroidal medications specifically, please see below. ACTIVITY  You may resume your normal daily activities. Spend the remainder of the day resting -  avoid any strenuous activity. CALL M.D. ANY SIGN OF   Increasing pain, nausea, vomiting  Abdominal distension (swelling)  New increased bleeding (oral or rectal)  Fever (chills)  Pain in chest area  Bloody discharge from nose or mouth  Shortness of breath    You may not  take any Advil, Aspirin, Ibuprofen, Motrin, Aleve, or Goodys for 10 days, ONLY  Tylenol as needed for pain.     Post procedure diagnosis: gastric ulcer        Follow-up Instructions:   Call Dr. Halie Santana  Results of procedure / biopsy in 10 days  Telephone #  [phone]        DISCHARGE SUMMARY from Nurse    The following personal items collected during your admission are returned to you:   Dental Appliance: Dental Appliances: None  Vision: Visual Aid: None  Hearing Aid:    Jewelry:    Clothing:    Other Valuables:    Valuables sent to safe:          Patient Education        Peptic Ulcer Disease: Care Instructions  Your Care Instructions    Peptic ulcers are sores on the inside of the stomach or the small intestine (such as a duodenal ulcer). They are most often caused by an infection with bacteria or from use of nonsteroidal anti-inflammatory drugs (NSAIDs). NSAIDs include aspirin, ibuprofen (Advil), and naproxen (Aleve). Your doctor may have prescribed medicine to reduce stomach acid. You also may need to take antibiotics if your peptic ulcers are caused by an infection. You can help yourself heal and keep ulcers from coming back by making some changes in your lifestyle. Quit smoking. Limit alcohol. Follow-up care is a key part of your treatment and safety. Be sure to make and go to all appointments, and call your doctor if you are having problems. It's also a good idea to know your test results and keep a list of the medicines you take. How can you care for yourself at home? · Be safe with medicines. Take your medicines exactly as prescribed. Call your doctor if you think you are having a problem with your medicine. · Do not take aspirin or other NSAIDs such as ibuprofen (Advil or Motrin) or naproxen (Aleve). Ask your doctor what you can take for pain. · Do not smoke. Smoking can make ulcers worse. If you need help quitting, talk to your doctor about stop-smoking programs and medicines. These can increase your chances of quitting for good. · Drink in moderation or avoid drinking alcohol. · Eat a balanced diet of small, frequent meals. See a dietitian if you need help planning your meals. When should you call for help? Call 911 anytime you think you may need emergency care. For example, call if:    · You vomit blood or what looks like coffee grounds.     · You pass maroon or very bloody stools.    Call your doctor now or seek immediate medical care if:    · You have new or worse belly pain.     · Your stools are black and look like tar, or they have streaks of blood.     · You vomit.    Watch closely for changes in your health, and be sure to contact your doctor if:    · You do not get better as expected. Where can you learn more? Go to http://santo-juani.info/. Enter E272 in the search box to learn more about \"Peptic Ulcer Disease: Care Instructions. \"  Current as of: November 7, 2018  Content Version: 12.2  © 9526-9598 [employer]. Care instructions adapted under license by [employer] (which disclaims liability or warranty for this information). If you have questions about a medical condition or this instruction, always ask your healthcare professional. [place] [address] any warranty or liability for your use of this information.

## 2019-10-15 NOTE — PROGRESS NOTES

## 2019-10-15 NOTE — ROUTINE PROCESS
[facility] 1945 
[id number] Situation: 
Verbal report received from: Zach Schafer RN Procedure: Procedure(s): ESOPHAGOGASTRODUODENOSCOPY (EGD) ESOPHAGOGASTRODUODENAL (EGD) BIOPSY Background: 
 
Preoperative diagnosis: EPIGASTRIC PAIN , ABDOMINAL PAIN , GASTRIC ULCER HISTORY OF , O/E HEPATOMEGALY , DYSPHAGIA Postoperative diagnosis: gastric ulcer :  Dr. Robert Torres Assistant(s): Endoscopy Technician-1: Kaden Denis Endoscopy RN-1: Ulises Beatty RN Endoscopy RN-2: Delmar Maloney RN Specimens:  
ID Type Source Tests Collected by Time Destination 1 : gastric ulcer biopsy Preservative   Tamra Madden MD 10/15/2019 1143 Pathology H. Pylori  yes Assessment: 
Intra-procedure medications Anesthesia gave intra-procedure sedation and medications, see anesthesia flow sheet yes Intravenous fluids: NS@ [facility] Vital signs stable Abdominal assessment: round and soft Recommendation: 
Discharge patient per MD order. Family or Friend Permission to share finding with family or friend yes

## 2019-10-15 NOTE — ANESTHESIA PREPROCEDURE EVALUATION
Anesthetic History   No history of anesthetic complications            Review of Systems / Medical History  Patient summary reviewed, nursing notes reviewed and pertinent labs reviewed    Pulmonary            Asthma        Neuro/Psych              Cardiovascular    Hypertension          CAD and cardiac stents    Exercise tolerance: >4 METS     GI/Hepatic/Renal     GERD          Comments: Ab pain Endo/Other        Arthritis     Other Findings            Physical Exam    Airway  Mallampati: I  TM Distance: > 6 cm  Neck ROM: normal range of motion   Mouth opening: Normal     Cardiovascular    Rhythm: regular  Rate: normal         Dental  No notable dental hx       Pulmonary  Breath sounds clear to auscultation               Abdominal         Other Findings            Anesthetic Plan    ASA: 3  Anesthesia type: MAC          Induction: Intravenous  Anesthetic plan and risks discussed with: Patient

## 2020-09-01 ENCOUNTER — HOSPITAL ENCOUNTER (OUTPATIENT)
Dept: VASCULAR SURGERY | Age: 75
Discharge: HOME | End: 2020-09-01
Attending: PODIATRIST
Payer: MEDICARE

## 2020-09-01 DIAGNOSIS — G57.92 MONONEUROPATHY OF LEFT LOWER LIMB: ICD-10-CM

## 2020-09-01 PROCEDURE — 93922 UPR/L XTREMITY ART 2 LEVELS: CPT

## 2020-09-02 LAB
LEFT ABI: 1.18
LEFT ANTERIOR TIBIAL: 154 MMHG
LEFT ARM BP: 147 MMHG
LEFT POSTERIOR TIBIAL: 174 MMHG
LEFT TBI: 0.67
LEFT TOE PRESSURE: 99 MMHG
RIGHT ABI: 1.17
RIGHT ANTERIOR TIBIAL: 157 MMHG
RIGHT ARM BP: 146 MMHG
RIGHT POSTERIOR TIBIAL: 172 MMHG
RIGHT TBI: 0.88
RIGHT TOE PRESSURE: 129 MMHG

## 2020-12-01 ENCOUNTER — TRANSCRIBE ORDER (OUTPATIENT)
Dept: SCHEDULING | Age: 75
End: 2020-12-01

## 2020-12-01 DIAGNOSIS — R13.10 DYSPHAGIA: ICD-10-CM

## 2020-12-01 DIAGNOSIS — K21.9 ESOPHAGEAL REFLUX: Primary | ICD-10-CM

## 2020-12-01 DIAGNOSIS — R05.9 COUGH: ICD-10-CM

## 2020-12-02 ENCOUNTER — TRANSCRIBE ORDER (OUTPATIENT)
Dept: SCHEDULING | Age: 75
End: 2020-12-02

## 2020-12-02 DIAGNOSIS — R13.10 DYSPHAGIA: ICD-10-CM

## 2020-12-02 DIAGNOSIS — K21.9 GASTROESOPHAGEAL REFLUX DISEASE: Primary | ICD-10-CM

## 2020-12-02 DIAGNOSIS — R05.9 COUGH: ICD-10-CM

## 2020-12-09 ENCOUNTER — TRANSCRIBE ORDER (OUTPATIENT)
Dept: SCHEDULING | Age: 75
End: 2020-12-09

## 2020-12-10 ENCOUNTER — HOSPITAL ENCOUNTER (OUTPATIENT)
Dept: GENERAL RADIOLOGY | Age: 75
Discharge: HOME | End: 2020-12-10
Attending: SPECIALIST
Payer: MEDICARE

## 2020-12-10 DIAGNOSIS — R05.9 COUGH: ICD-10-CM

## 2020-12-10 DIAGNOSIS — R13.10 DYSPHAGIA: ICD-10-CM

## 2020-12-10 DIAGNOSIS — K21.9 ESOPHAGEAL REFLUX: ICD-10-CM

## 2020-12-10 PROCEDURE — 92611 MOTION FLUOROSCOPY/SWALLOW: CPT | Performed by: SPEECH-LANGUAGE PATHOLOGIST

## 2020-12-10 PROCEDURE — 74230 X-RAY XM SWLNG FUNCJ C+: CPT

## 2020-12-10 NOTE — PROGRESS NOTES
[address] [place] [address] STUDY  Patient: Saintclair Capra (72 y.o. female)  Date: 12/10/2020  Referring Provider:  Dr. Em Velazquez:   Patient complaints of persistent, chronic coughing around meals but occasionally outside of meals. She endorses sensation of \"choking\" at times. She underwent an esophageal dilatation ~1 year ago which did not help with coughing per patient. No history of respiratory infections. She has noted a change in her singing voice also. OBJECTIVE:   Past Medical History:   Past Medical History:   Diagnosis Date    Asthma     mild, uses inhaler prn only. No problems x > 1 year/does not use inhaler now    Broken wrist 11/20/2013    right wrist    CAD (coronary artery disease) 6469,5364    stents. x2 Currently asymptomatic & exercises 5x/ week    Cancer ([address]     skin, mole removed    DDD (degenerative disc disease)     DJD (degenerative joint disease)     Environmental allergies     GERD (gastroesophageal reflux disease)     Hypercholesterolemia     Hypertension     Osteopenia     PUD (peptic ulcer disease)     one in esophagus/two in stomach    Recurrent UTI      Past Surgical History:   Procedure Laterality Date    COLONOSCOPY,DIAGNOSTIC  11/20/2015     tics; no polyps; Dr. Gabe Reyna; no follow up for screening    ENDOSCOPY, COLON, DIAGNOSTIC  7/15//2005    neg; 10 year repeat; Dr. Yara Barth  2009    1 stent : 2009 R coronary    HX HEART CATHETERIZATION      LAD stented 5/2012    HX HIP REPLACEMENT Left 01/17/2017    HX ORTHOPAEDIC  2/2011    right RCR     HX ORTHOPAEDIC  1/14/14    L4-5 DECOMPRESSION AND FUSION    HX ORTHOPAEDIC      r toe, bone removal    HX ORTHOPAEDIC Right 2/2015    THR    HX SHITAL AND BSO      HX TONSILLECTOMY      HX TYMPANOSTOMY  02/2013    in Right ear    HX WRIST FRACTURE TX Right 5/2013    ORIF - wrist     Current Dietary Status:  Regular/thin liquids  Radiologist: Dr. Real Mitchell: Lateral;AP;Fluoro  Patient Position: Standing upright    Trial 1:   Consistency Presented: All consistencies; Thin liquid; Solid;Puree;Pill/Tablet   How Presented: Self-fed/presented;SLP-fed/presented;Cup/sip;Spoon; Successive swallows       Bolus Acceptance: No impairment   Bolus Formation/Control: No impairment:     Propulsion: No impairment   Oral Residue: None   Initiation of Swallow: Triggered at vallecula   Timing: No impairment   Penetration: Trace;During swallow; To cords;From initial swallow; To laryngeal vestibule   Aspiration/Timing: No evidence of aspiration   Pharyngeal Clearance: Vallecular residue;Pyriform residue ; Less than 10%       Decreased Tongue Base Retraction?: No  Laryngeal Elevation: Incomplete laryngeal closure; Reduced excursion with laryngeal vestibule gap  Aspiration/Penetration Score: 3 (Penetration/Visible residue-Contrast remains above the folds/cords, but is not cleared)  Pharyngeal Symmetry: Symmetrical;Bilateral residue  Pharyngeal-Esophageal Segment: No impairment; Other (comment)(esophageal sweep completed with no significant findings per Radiologist)  Pharyngeal Dysfunction: Decreased elevation/closure;Decreased strength    Oral Phase Severity: No impairment  Pharyngeal Phase Severity: Mild    ASSESSMENT :  Based on the objective data described above, the patient presents with no oral and mild pharyngeal dysphagia characterized by deep penetration during the swallow of thin liquids only. No cough elicited during exam. Penetration secondary to reduced hyolaryngeal excursion and laryngeal vestibular closure. No aspiration evident. No penetration or aspiration with purees, solids or barium tablet.  Esophageal sweep with no significant findings per Radiologist.      PLAN/RECOMMENDATIONS :  1. Consider trial of swallowing therapy to initiate pharyngeal exercises for above impairments  2. Continue reflux precautions  3. Consider ENT consult to visualize vocal cords given change in singing voice, persistent cough. COMMUNICATION/EDUCATION:   The above findings and recommendations were discussed with: patient who verbalized understanding. Thank you for this referral.  Jem De La Garza.  Kj Quiñones MS, CCC-SLP, BCS-S  Time Calculation: 15 mins

## 2022-03-18 PROBLEM — R73.9 HYPERGLYCEMIA: Status: ACTIVE | Noted: 2017-08-08

## 2022-03-19 PROBLEM — K59.00 CONSTIPATION: Status: ACTIVE | Noted: 2017-01-27

## 2022-03-19 PROBLEM — M81.0 OSTEOPOROSIS: Status: ACTIVE | Noted: 2017-04-04

## 2022-03-19 PROBLEM — F32.A ANXIETY AND DEPRESSION: Status: ACTIVE | Noted: 2017-01-03

## 2022-03-19 PROBLEM — F41.9 ANXIETY AND DEPRESSION: Status: ACTIVE | Noted: 2017-01-03

## 2023-05-10 RX ORDER — ACETAMINOPHEN 500 MG
1000 TABLET ORAL
COMMUNITY

## 2023-05-10 RX ORDER — LOSARTAN POTASSIUM AND HYDROCHLOROTHIAZIDE 25; 100 MG/1; MG/1
1 TABLET ORAL DAILY
COMMUNITY
Start: 2018-02-18

## 2023-05-10 RX ORDER — DILTIAZEM HYDROCHLORIDE 240 MG/1
1 CAPSULE, COATED, EXTENDED RELEASE ORAL DAILY
COMMUNITY
Start: 2017-12-24

## 2023-05-10 RX ORDER — PANTOPRAZOLE SODIUM 40 MG/1
40 TABLET, DELAYED RELEASE ORAL DAILY
COMMUNITY

## 2023-05-10 RX ORDER — ASPIRIN 81 MG/1
81 TABLET ORAL DAILY
COMMUNITY
Start: 2017-04-04

## 2023-05-10 RX ORDER — ATORVASTATIN CALCIUM 80 MG/1
1 TABLET, FILM COATED ORAL DAILY
COMMUNITY
Start: 2017-08-10

## 2023-05-10 RX ORDER — FLUTICASONE PROPIONATE 50 MCG
2 SPRAY, SUSPENSION (ML) NASAL DAILY
COMMUNITY

## 2023-05-10 RX ORDER — MELOXICAM 15 MG/1
1 TABLET ORAL DAILY
COMMUNITY
Start: 2017-08-23

## 2023-05-10 RX ORDER — DOXAZOSIN 2 MG/1
2 TABLET ORAL NIGHTLY
COMMUNITY

## 2023-05-10 RX ORDER — TRIAMCINOLONE ACETONIDE 0.25 MG/G
CREAM TOPICAL
COMMUNITY

## 2023-05-10 RX ORDER — ESCITALOPRAM OXALATE 10 MG/1
10 TABLET ORAL DAILY
COMMUNITY
Start: 2017-03-10

## (undated) DEVICE — 1200 GUARD II KIT W/5MM TUBE W/O VAC TUBE: Brand: GUARDIAN

## (undated) DEVICE — BW-412T DISP COMBO CLEANING BRUSH: Brand: SINGLE USE COMBINATION CLEANING BRUSH

## (undated) DEVICE — CATH IV AUTOGRD BC BLU 22GA 25 -- INSYTE

## (undated) DEVICE — KENDALL RADIOLUCENT FOAM MONITORING ELECTRODE -RECTANGULAR SHAPE: Brand: KENDALL

## (undated) DEVICE — SYRINGE MED 20ML STD CLR PLAS LUERLOCK TIP N CTRL DISP

## (undated) DEVICE — SOLIDIFIER FLUID 3000 CC ABSORB

## (undated) DEVICE — FORCEPS BX L240CM JAW DIA2.8MM L CAP W/ NDL MIC MESH TOOTH

## (undated) DEVICE — Device: Brand: MEDICAL ACTION INDUSTRIES

## (undated) DEVICE — NEEDLE HYPO 18GA L1.5IN PNK S STL HUB POLYPR SHLD REG BVL

## (undated) DEVICE — CONTAINER SPEC 20 ML LID NEUT BUFF FORMALIN 10 % POLYPR STS

## (undated) DEVICE — BAG SPEC BIOHZD LF 2MIL 6X10IN -- CONVERT TO ITEM 357326

## (undated) DEVICE — CANN NASAL O2 CAPNOGRAPHY AD -- FILTERLINE

## (undated) DEVICE — SET ADMIN 16ML TBNG L100IN 2 Y INJ SITE IV PIGGY BK DISP

## (undated) DEVICE — BAG BELONG PT PERS CLEAR HANDL

## (undated) DEVICE — Z DISCONTINUED NO SUB IDED SET EXTN W/ 4 W STPCOCK M SPIN LOK 36IN

## (undated) DEVICE — ENDO CARRY-ON PROCEDURE KIT INCLUDES ENZYMATIC SPONGE, GAUZE, BIOHAZARD LABEL, TRAY, LUBRICANT, DIRTY SCOPE LABEL, WATER LABEL, TRAY, DRAWSTRING PAD, AND DEFENDO 4-PIECE KIT.: Brand: ENDO CARRY-ON PROCEDURE KIT

## (undated) DEVICE — TUBING HYDR IRR --